# Patient Record
Sex: MALE | Race: WHITE | Employment: UNEMPLOYED | RURAL
[De-identification: names, ages, dates, MRNs, and addresses within clinical notes are randomized per-mention and may not be internally consistent; named-entity substitution may affect disease eponyms.]

---

## 2023-07-13 ENCOUNTER — OFFICE VISIT (OUTPATIENT)
Dept: PRIMARY CARE CLINIC | Age: 33
End: 2023-07-13

## 2023-07-13 VITALS
DIASTOLIC BLOOD PRESSURE: 78 MMHG | SYSTOLIC BLOOD PRESSURE: 121 MMHG | HEART RATE: 80 BPM | TEMPERATURE: 97.5 F | OXYGEN SATURATION: 99 %

## 2023-07-13 DIAGNOSIS — T78.40XA ALLERGIC REACTION, INITIAL ENCOUNTER: Primary | ICD-10-CM

## 2023-07-13 DIAGNOSIS — Z91.09 ENVIRONMENTAL ALLERGIES: ICD-10-CM

## 2023-07-13 DIAGNOSIS — R22.0 FACIAL SWELLING: ICD-10-CM

## 2023-07-13 RX ORDER — MONTELUKAST SODIUM 4 MG/1
TABLET, CHEWABLE ORAL
COMMUNITY
Start: 2023-05-09

## 2023-07-13 RX ORDER — LEVOCETIRIZINE DIHYDROCHLORIDE 5 MG/1
TABLET, FILM COATED ORAL
COMMUNITY
Start: 2023-06-25 | End: 2023-07-13 | Stop reason: ALTCHOICE

## 2023-07-13 RX ORDER — METHYLPREDNISOLONE SODIUM SUCCINATE 40 MG/ML
40 INJECTION, POWDER, LYOPHILIZED, FOR SOLUTION INTRAMUSCULAR; INTRAVENOUS ONCE
Status: COMPLETED | OUTPATIENT
Start: 2023-07-13 | End: 2023-07-13

## 2023-07-13 RX ORDER — PANTOPRAZOLE SODIUM 40 MG/1
TABLET, DELAYED RELEASE ORAL
COMMUNITY
Start: 2023-04-05

## 2023-07-13 RX ORDER — MONTELUKAST SODIUM 10 MG/1
10 TABLET ORAL NIGHTLY
Qty: 30 TABLET | Refills: 3 | Status: SHIPPED | OUTPATIENT
Start: 2023-07-13

## 2023-07-13 RX ORDER — PREDNISONE 20 MG/1
20 TABLET ORAL 2 TIMES DAILY
Qty: 10 TABLET | Refills: 0 | Status: SHIPPED | OUTPATIENT
Start: 2023-07-13 | End: 2023-07-18

## 2023-07-13 RX ORDER — DICYCLOMINE HCL 20 MG
TABLET ORAL
COMMUNITY
Start: 2023-05-09

## 2023-07-13 RX ORDER — LORATADINE 10 MG/1
10 TABLET ORAL DAILY
Qty: 30 TABLET | Refills: 3 | Status: SHIPPED | OUTPATIENT
Start: 2023-07-13

## 2023-07-13 RX ADMIN — METHYLPREDNISOLONE SODIUM SUCCINATE 40 MG: 40 INJECTION, POWDER, LYOPHILIZED, FOR SOLUTION INTRAMUSCULAR; INTRAVENOUS at 14:26

## 2023-07-13 SDOH — ECONOMIC STABILITY: INCOME INSECURITY: HOW HARD IS IT FOR YOU TO PAY FOR THE VERY BASICS LIKE FOOD, HOUSING, MEDICAL CARE, AND HEATING?: NOT VERY HARD

## 2023-07-13 SDOH — ECONOMIC STABILITY: FOOD INSECURITY: WITHIN THE PAST 12 MONTHS, THE FOOD YOU BOUGHT JUST DIDN'T LAST AND YOU DIDN'T HAVE MONEY TO GET MORE.: NEVER TRUE

## 2023-07-13 SDOH — ECONOMIC STABILITY: FOOD INSECURITY: WITHIN THE PAST 12 MONTHS, YOU WORRIED THAT YOUR FOOD WOULD RUN OUT BEFORE YOU GOT MONEY TO BUY MORE.: NEVER TRUE

## 2023-07-13 SDOH — ECONOMIC STABILITY: HOUSING INSECURITY
IN THE LAST 12 MONTHS, WAS THERE A TIME WHEN YOU DID NOT HAVE A STEADY PLACE TO SLEEP OR SLEPT IN A SHELTER (INCLUDING NOW)?: NO

## 2023-07-13 ASSESSMENT — ENCOUNTER SYMPTOMS
SHORTNESS OF BREATH: 0
TROUBLE SWALLOWING: 0
STRIDOR: 0
GASTROINTESTINAL NEGATIVE: 1

## 2023-07-13 ASSESSMENT — PATIENT HEALTH QUESTIONNAIRE - PHQ9
SUM OF ALL RESPONSES TO PHQ9 QUESTIONS 1 & 2: 0
2. FEELING DOWN, DEPRESSED OR HOPELESS: 0
SUM OF ALL RESPONSES TO PHQ QUESTIONS 1-9: 0
1. LITTLE INTEREST OR PLEASURE IN DOING THINGS: 0
SUM OF ALL RESPONSES TO PHQ QUESTIONS 1-9: 0

## 2023-07-13 NOTE — PROGRESS NOTES
SUBJECTIVE:    Patient ID: Lakesha Mcghee is a 28 y.o.male. Chief Complaint   Patient presents with    Facial Swelling     Allergic reaction to some treated lumber. 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 He has been really concerned with his eye being swollen. HPI:    Patient presents to clinic with allergic reaction to treated lumbar. Patient with facial swelling, redness and rash on bilateral upper extremities this morning. Patient started working in [facility] [employer] [employer] last month and every time he is exposed to treated lumbar he has been breaking out in a rash on his bilateral arms. He follows with an allergy doctor and last seen them 1 year ago. On xyzal daily and has been on med for over 1 year. Took benadryl and facial swelling/eye swelling better. Face feels \"sun burnt\". No SOB, throat swelling or CP. No relieving or worsening factors. No other treatments. Patient's medications, allergies, past medical, surgical, social and family histories were reviewed and updated as appropriate in electronic medical record. Outpatient Medications Marked as Taking for the 7/13/23 encounter (Office Visit) with ROMULO Pino - CNP   Medication Sig Dispense Refill    levocetirizine (XYZAL) 5 MG tablet       pantoprazole (PROTONIX) 40 MG tablet       dicyclomine (BENTYL) 20 MG tablet       colestipol (COLESTID) 1 g tablet       gabapentin (NEURONTIN) 300 MG capsule Take 1 capsule by mouth 3 times daily. omeprazole (PRILOSEC) 20 MG delayed release capsule Take 2 capsules by mouth daily          Review of Systems   HENT:  Negative for trouble swallowing. Eyes:         Bilateral eyes swelling from allergic reaction   Respiratory:  Negative for shortness of breath and stridor. Cardiovascular: Negative. Gastrointestinal: Negative. Skin:  Positive for rash.    Allergic/Immunologic: Positive for

## 2023-07-13 NOTE — PROGRESS NOTES
Chief Complaint   Patient presents with    Facial Swelling     Allergic reaction to some treated lumber. 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 He has been really concerned with his eye being swollen. Administrations This Visit       methylPREDNISolone sodium succ (SOLU-MEDROL) injection 40 mg       Admin Date  07/13/2023  14:26 Action  Given Dose  40 mg Route  IntraMUSCular Site  Dorsogluteal Left Administered By  Cris Pantoja    Ordering Provider: Armida [place], APRN - CNP    NDC: 9827-4371-03    Lot#: [id number]    : [employer].     Patient Supplied?: No

## 2024-01-03 ENCOUNTER — OFFICE VISIT (OUTPATIENT)
Dept: PRIMARY CARE CLINIC | Age: 34
End: 2024-01-03
Payer: MEDICAID

## 2024-01-03 VITALS — OXYGEN SATURATION: 97 % | HEART RATE: 88 BPM | TEMPERATURE: 97.1 F

## 2024-01-03 DIAGNOSIS — H65.193 ACUTE MEE (MIDDLE EAR EFFUSION), BILATERAL: ICD-10-CM

## 2024-01-03 DIAGNOSIS — H66.92 ACUTE LEFT OTITIS MEDIA: Primary | ICD-10-CM

## 2024-01-03 DIAGNOSIS — J02.9 SORE THROAT: ICD-10-CM

## 2024-01-03 LAB — S PYO AG THROAT QL: NORMAL

## 2024-01-03 PROCEDURE — G8484 FLU IMMUNIZE NO ADMIN: HCPCS | Performed by: NURSE PRACTITIONER

## 2024-01-03 PROCEDURE — 1036F TOBACCO NON-USER: CPT | Performed by: NURSE PRACTITIONER

## 2024-01-03 PROCEDURE — G8427 DOCREV CUR MEDS BY ELIG CLIN: HCPCS | Performed by: NURSE PRACTITIONER

## 2024-01-03 PROCEDURE — 99213 OFFICE O/P EST LOW 20 MIN: CPT | Performed by: NURSE PRACTITIONER

## 2024-01-03 PROCEDURE — G8421 BMI NOT CALCULATED: HCPCS | Performed by: NURSE PRACTITIONER

## 2024-01-03 RX ORDER — PREDNISONE 10 MG/1
10 TABLET ORAL 2 TIMES DAILY
Qty: 10 TABLET | Refills: 0 | Status: SHIPPED | OUTPATIENT
Start: 2024-01-03 | End: 2024-01-08

## 2024-01-03 RX ORDER — AMOXICILLIN AND CLAVULANATE POTASSIUM 875; 125 MG/1; MG/1
1 TABLET, FILM COATED ORAL 2 TIMES DAILY
Qty: 20 TABLET | Refills: 0 | Status: SHIPPED | OUTPATIENT
Start: 2024-01-03 | End: 2024-01-13

## 2024-01-03 NOTE — PROGRESS NOTES
SUBJECTIVE:    Patient ID: Brandon Noble is a 33 y.o.male.    Chief Complaint   Patient presents with    Tinnitus     Left ear    Pharyngitis     Pt reports he woke up today with a bad sore throat.          HPI:    Patient presents to clinic with ringing of left ear few days.  Patient also complains of sore throat and congestion.  No sick contacts.  Mild intermittent cough.  Sore throat is most bothersome.  No fever, chills, body aches.  No relieving or worsening factors.  No treatments.  He is on Claritin and Singulair.    Patient's medications, allergies, past medical, surgical, social and family histories were reviewed and updated as appropriate in electronic medical record.        Outpatient Medications Marked as Taking for the 1/3/24 encounter (Office Visit) with Addis Mirza APRN - CNP   Medication Sig Dispense Refill    pantoprazole (PROTONIX) 40 MG tablet       dicyclomine (BENTYL) 20 MG tablet       colestipol (COLESTID) 1 g tablet       loratadine (CLARITIN) 10 MG tablet Take 1 tablet by mouth daily allergies 30 tablet 3    montelukast (SINGULAIR) 10 MG tablet Take 1 tablet by mouth nightly allergies 30 tablet 3    gabapentin (NEURONTIN) 300 MG capsule Take 1 capsule by mouth 3 times daily.      omeprazole (PRILOSEC) 20 MG delayed release capsule Take 2 capsules by mouth daily          Review of Systems   Constitutional:  Negative for chills, diaphoresis, fatigue and fever.   HENT:  Positive for congestion, ear pain, sore throat and tinnitus.    Respiratory:  Positive for cough.    All other systems reviewed and are negative.      Past Medical History:   Diagnosis Date    GERD (gastroesophageal reflux disease)     Murmur, cardiac     as a child    Neck pain, musculoskeletal     3 bulging disk     Past Surgical History:   Procedure Laterality Date    BONE MARROW HARVEST      as a child from hip to right hand middle finger     No family history on file.   Social History     Tobacco Use   Smoking

## 2024-01-03 NOTE — PROGRESS NOTES
Chief Complaint   Patient presents with    Tinnitus     Left ear    Pharyngitis     Pt reports he woke up today with a bad sore throat.        Have you seen any other physician or provider since your last visit no    Have you had any other diagnostic tests since your last visit? no    Have you changed or stopped any medications since your last visit? no

## 2024-01-10 ASSESSMENT — ENCOUNTER SYMPTOMS
SORE THROAT: 1
COUGH: 1

## 2024-03-12 ENCOUNTER — OFFICE VISIT (OUTPATIENT)
Dept: PRIMARY CARE CLINIC | Age: 34
End: 2024-03-12
Payer: MEDICAID

## 2024-03-12 VITALS
WEIGHT: 237 LBS | DIASTOLIC BLOOD PRESSURE: 96 MMHG | BODY MASS INDEX: 33.05 KG/M2 | OXYGEN SATURATION: 93 % | HEART RATE: 95 BPM | SYSTOLIC BLOOD PRESSURE: 145 MMHG

## 2024-03-12 DIAGNOSIS — S03.00XA DISLOCATION OF TEMPOROMANDIBULAR JOINT, INITIAL ENCOUNTER: ICD-10-CM

## 2024-03-12 DIAGNOSIS — H93.12 TINNITUS OF LEFT EAR: Primary | ICD-10-CM

## 2024-03-12 PROCEDURE — G8427 DOCREV CUR MEDS BY ELIG CLIN: HCPCS | Performed by: PHYSICIAN ASSISTANT

## 2024-03-12 PROCEDURE — G8484 FLU IMMUNIZE NO ADMIN: HCPCS | Performed by: PHYSICIAN ASSISTANT

## 2024-03-12 PROCEDURE — 1036F TOBACCO NON-USER: CPT | Performed by: PHYSICIAN ASSISTANT

## 2024-03-12 PROCEDURE — G8419 CALC BMI OUT NRM PARAM NOF/U: HCPCS | Performed by: PHYSICIAN ASSISTANT

## 2024-03-12 PROCEDURE — 99213 OFFICE O/P EST LOW 20 MIN: CPT | Performed by: PHYSICIAN ASSISTANT

## 2024-03-12 RX ORDER — PREDNISONE 20 MG/1
20 TABLET ORAL 2 TIMES DAILY
Qty: 10 TABLET | Refills: 0 | Status: SHIPPED | OUTPATIENT
Start: 2024-03-12 | End: 2024-03-17

## 2024-03-12 ASSESSMENT — ENCOUNTER SYMPTOMS
GASTROINTESTINAL NEGATIVE: 1
RESPIRATORY NEGATIVE: 1

## 2024-03-12 NOTE — PROGRESS NOTES
Chief Complaint   Patient presents with    Tinnitus     Pt has been having ringing in her left ear for the last week, he was seen in January for same issues. Also mentioned that he had a filling in a tooth close to ear fall out about 6 weeks ago.        Have you seen any other physician or provider since your last visit no    Have you had any other diagnostic tests since your last visit? no    Have you changed or stopped any medications since your last visit? no

## 2024-03-12 NOTE — PROGRESS NOTES
Subjective:     Brandon Noble is a 33 y.o. male.    Chief Complaint   Patient presents with    Tinnitus     Pt has been having ringing in her left ear for the last week, he was seen in January for same issues. Also mentioned that he had a filling in a tooth close to ear fall out about 6 weeks ago.        HPI    Pt here with c/o left ear echoing, ringing. Denies any sxs in right ear. Did have a filling fall out in the upper/back tooth. Has happened in the past several times. Denies any pain. Has had some mild hearing loss, no dizziness. Occasionally off balance when standing too fast. Sxs began 1 week ago and has worsened. Has taken no otc meds. No URI sxs, except a little cough. No dc.       Current Outpatient Medications:     predniSONE (DELTASONE) 20 MG tablet, Take 1 tablet by mouth 2 times daily for 5 days, Disp: 10 tablet, Rfl: 0    pantoprazole (PROTONIX) 40 MG tablet, , Disp: , Rfl:     colestipol (COLESTID) 1 g tablet, , Disp: , Rfl:     loratadine (CLARITIN) 10 MG tablet, Take 1 tablet by mouth daily allergies, Disp: 30 tablet, Rfl: 3    montelukast (SINGULAIR) 10 MG tablet, Take 1 tablet by mouth nightly allergies, Disp: 30 tablet, Rfl: 3    dicyclomine (BENTYL) 20 MG tablet, , Disp: , Rfl:     gabapentin (NEURONTIN) 300 MG capsule, Take 1 capsule by mouth 3 times daily. (Patient not taking: Reported on 3/12/2024), Disp: , Rfl:   Past Medical History:   Diagnosis Date    GERD (gastroesophageal reflux disease)     Murmur, cardiac     as a child    Neck pain, musculoskeletal     3 bulging disk     Past Surgical History:   Procedure Laterality Date    BONE MARROW HARVEST      as a child from hip to right hand middle finger      reports that he has never smoked. He has never used smokeless tobacco. He reports current alcohol use. He reports that he does not use drugs.  Review of Systems   Constitutional: Negative.    HENT: Negative.     Respiratory: Negative.     Cardiovascular: Negative.    Gastrointestinal:

## 2024-10-12 ENCOUNTER — HOSPITAL ENCOUNTER (EMERGENCY)
Facility: HOSPITAL | Age: 34
Discharge: HOME | End: 2024-10-12
Attending: EMERGENCY MEDICINE
Payer: COMMERCIAL

## 2024-10-12 ENCOUNTER — APPOINTMENT (OUTPATIENT)
Facility: HOSPITAL | Age: 34
End: 2024-10-12
Payer: COMMERCIAL

## 2024-10-12 VITALS
SYSTOLIC BLOOD PRESSURE: 163 MMHG | TEMPERATURE: 98.4 F | OXYGEN SATURATION: 97 % | HEART RATE: 106 BPM | WEIGHT: 240 LBS | RESPIRATION RATE: 18 BRPM | HEIGHT: 71 IN | BODY MASS INDEX: 33.6 KG/M2 | DIASTOLIC BLOOD PRESSURE: 105 MMHG

## 2024-10-12 DIAGNOSIS — M10.071 ACUTE IDIOPATHIC GOUT OF RIGHT ANKLE: Primary | ICD-10-CM

## 2024-10-12 PROCEDURE — 99283 EMERGENCY DEPT VISIT LOW MDM: CPT

## 2024-10-12 PROCEDURE — 73610 X-RAY EXAM OF ANKLE: CPT

## 2024-10-12 RX ORDER — COLCHICINE 0.6 MG/1
0.6 TABLET ORAL DAILY
Qty: 5 TABLET | Refills: 0 | Status: SHIPPED | OUTPATIENT
Start: 2024-10-12

## 2024-10-12 RX ORDER — COLCHICINE 0.6 MG/1
0.6 TABLET ORAL ONCE
Status: COMPLETED | OUTPATIENT
Start: 2024-10-12 | End: 2024-10-12

## 2024-10-12 RX ADMIN — COLCHICINE 0.6 MG: 0.6 TABLET, FILM COATED ORAL at 10:48

## 2024-10-12 NOTE — FSED PROVIDER NOTE
"Subjective  History of Present Illness:    This is a 34 year old male who presents with complaints of right ankle pain that started three days ago but got acutely worse last night. Patient states that he has had this happen 6-8 times previously but this is worse than usual.  Symptoms usually improve without any intervention.  He has never sought medical care for this in the past.  He states that today he is not able to ambulate which is different.  Patient has a past medical history of acid reflux and takes pantoprazole.  He also is a heavy drinker.  He denies any fevers.  No trauma to his ankle.  He has not taken medications for symptoms.       Nurses Notes reviewed and agree, including vitals, allergies, social history and prior medical history.     REVIEW OF SYSTEMS: All systems reviewed and not pertinent unless noted.  Review of Systems    Past Medical History:   Diagnosis Date    GERD (gastroesophageal reflux disease)        Allergies:    Hydrocodone      Past Surgical History:   Procedure Laterality Date    ADENOIDECTOMY      BACK SURGERY      BONE MARROW HARVEST      from hip to put hin his finger    TONSILLECTOMY           Social History     Socioeconomic History    Marital status: Other   Tobacco Use    Smoking status: Never    Smokeless tobacco: Former   Substance and Sexual Activity    Alcohol use: Yes     Comment: occ         Family History   Problem Relation Age of Onset    Thyroid disease Mother     Kidney cancer Father        Objective  Physical Exam:  BP (!) 163/105 (BP Location: Left arm, Patient Position: Sitting)   Pulse 106   Temp 98.4 °F (36.9 °C) (Oral)   Resp 18   Ht 180.3 cm (71\")   Wt 109 kg (240 lb)   SpO2 97%   BMI 33.47 kg/m²      Physical Exam  Vitals and nursing note reviewed.   Constitutional:       Appearance: Normal appearance.   Cardiovascular:      Rate and Rhythm: Normal rate and regular rhythm.   Pulmonary:      Effort: Pulmonary effort is normal.      Breath sounds: " Normal breath sounds.   Musculoskeletal:      Comments: Ankle: Patient has swelling and redness noted to the right ankle.  Redness is along the medial malleolus.  Patient has pain with dorsiflexion and plantarflexion.  There is mild warmth.  No erythema extending into the proximal lower leg.  Pedal pulses intact bilaterally.   Neurological:      Mental Status: He is alert.         Procedures    ED Course:         Lab Results (last 24 hours)       ** No results found for the last 24 hours. **             XR Ankle 3+ View Right    Result Date: 10/12/2024  XR ANKLE 3+ VW RIGHT Date of Exam: 10/12/2024 10:06 AM EDT Indication: Right ankle pain Comparison: None available. Findings: There is no acute fracture or dislocation. There is diffuse soft tissue swelling. The ankle mortise is intact. There is spurring of the talonavicular joint consistent with mild arthritis. There is an ossific density which is corticated and adjacent to the tip of the medial malleolus. This could be due to old trauma versus an accessory ossicle. There is also a small calcaneal enthesophyte at the insertion of the distal Achilles tendon.     Impression: Impression: 1. No acute fracture or dislocation. 2. Diffuse soft tissue swelling. 3. Degenerative changes. Electronically Signed: Gary Murrell MD  10/12/2024 10:27 AM EDT  Workstation ID: [id number]        [facility]      Initial impression of presenting illness: Patient presents complaints of right ankle pain.  Clinical picture is indicative of gout especially in the setting of recurrent cyst.  Patient will be discharged with colchicine.  Discussed need for follow-up with his primary care provider symptoms persist.  Discussed return precautions.  Discussed findings and plan of care with the patient who is agreeable to discharge.    DDX: includes but is not limited to: Cellulitis, gout, septic arthritis      Medications   colchicine tablet 0.6 mg (has no administration in time range)         Data  interpreted: Nursing notes reviewed, vital signs reviewed.  Labs independently interpreted by me (CBC, CMP, lipase, UA, troponin, ABG, lactic acid, procalcitonin).  Imaging independently interpreted by me (x-ray, CT scan).  EKG independently interpreted by me.  O2 saturation:    Counseling: Discussed the results above with the patient regarding need for admission or discharge.  Patient understands and agrees plan of care.      -----  ED Disposition       ED Disposition   Discharge    Condition   Stable    Comment   --             Final diagnoses:   Acute idiopathic gout of right ankle      Your Follow-Up Providers       Go to  [facility] EMERGENCY DEPARTMENT [place].    Specialty: Emergency Medicine  Follow up details: As needed, If symptoms worsen  [address]  [facility] [address] [phone]             Pearl Lawrence, SHARIF In 1 week.    Specialty: Family Medicine  [address]  [address]  [facility] [id number] [phone]                       Contact information for after-discharge care    Follow-up information has not been specified.                    Your medication list        START taking these medications        Instructions Last Dose Given Next Dose Due   colchicine 0.6 MG tablet      Take 1 tablet by mouth Daily.              CONTINUE taking these medications        Instructions Last Dose Given Next Dose Due   omeprazole 20 MG capsule  Commonly known as: priLOSEC      Take 40 mg by mouth.                 Where to Get Your Medications        These medications were sent to [facility] [id number] - [place], [place] - [address] AT [address] - [phone]  - [phone]   [address], [facility] [address]      Phone: [phone]   colchicine 0.6 MG tablet

## 2025-02-01 SDOH — HEALTH STABILITY: PHYSICAL HEALTH: ON AVERAGE, HOW MANY DAYS PER WEEK DO YOU ENGAGE IN MODERATE TO STRENUOUS EXERCISE (LIKE A BRISK WALK)?: 0 DAYS

## 2025-02-04 ENCOUNTER — HOSPITAL ENCOUNTER (OUTPATIENT)
Facility: HOSPITAL | Age: 35
Discharge: HOME | End: 2025-02-04
Payer: MEDICAID

## 2025-02-04 ENCOUNTER — OFFICE VISIT (OUTPATIENT)
Age: 35
End: 2025-02-04
Payer: MEDICAID

## 2025-02-04 VITALS
OXYGEN SATURATION: 98 % | SYSTOLIC BLOOD PRESSURE: 146 MMHG | HEIGHT: 71 IN | DIASTOLIC BLOOD PRESSURE: 104 MMHG | BODY MASS INDEX: 32.9 KG/M2 | TEMPERATURE: 97.8 F | WEIGHT: 235 LBS | HEART RATE: 95 BPM

## 2025-02-04 DIAGNOSIS — J30.2 SEASONAL ALLERGIES: ICD-10-CM

## 2025-02-04 DIAGNOSIS — M79.2 NERVE PAIN: ICD-10-CM

## 2025-02-04 DIAGNOSIS — K21.9 GASTROESOPHAGEAL REFLUX DISEASE WITHOUT ESOPHAGITIS: ICD-10-CM

## 2025-02-04 DIAGNOSIS — Z76.89 ENCOUNTER TO ESTABLISH CARE: ICD-10-CM

## 2025-02-04 DIAGNOSIS — Z76.89 ENCOUNTER TO ESTABLISH CARE: Primary | ICD-10-CM

## 2025-02-04 DIAGNOSIS — F41.9 ANXIETY: ICD-10-CM

## 2025-02-04 DIAGNOSIS — M10.9 GOUT, UNSPECIFIED CAUSE, UNSPECIFIED CHRONICITY, UNSPECIFIED SITE: ICD-10-CM

## 2025-02-04 DIAGNOSIS — K76.0 FATTY LIVER: ICD-10-CM

## 2025-02-04 DIAGNOSIS — R03.0 ELEVATED BLOOD PRESSURE READING WITHOUT DIAGNOSIS OF HYPERTENSION: ICD-10-CM

## 2025-02-04 LAB
25(OH)D3 SERPL-MCNC: 23.7 NG/ML (ref 32–100)
ALBUMIN SERPL-MCNC: 5 G/DL (ref 3.4–4.8)
ALBUMIN/GLOB SERPL: 1.9 {RATIO} (ref 0.8–2)
ALP SERPL-CCNC: 69 U/L (ref 25–100)
ALT SERPL-CCNC: 122 U/L (ref 4–36)
ANION GAP SERPL CALCULATED.3IONS-SCNC: 12 MMOL/L (ref 3–16)
AST SERPL-CCNC: 90 U/L (ref 8–33)
BASOPHILS # BLD: 0.1 K/UL (ref 0–0.1)
BASOPHILS NFR BLD: 1.4 %
BILIRUB SERPL-MCNC: 0.6 MG/DL (ref 0.3–1.2)
BUN SERPL-MCNC: 16 MG/DL (ref 6–20)
CALCIUM SERPL-MCNC: 10.5 MG/DL (ref 8.5–10.5)
CHLORIDE SERPL-SCNC: 103 MMOL/L (ref 98–107)
CHOLEST SERPL-MCNC: 220 MG/DL (ref 0–200)
CO2 SERPL-SCNC: 25 MMOL/L (ref 20–30)
CREAT SERPL-MCNC: 1.1 MG/DL (ref 0.4–1.2)
EOSINOPHIL # BLD: 0.4 K/UL (ref 0–0.4)
EOSINOPHIL NFR BLD: 5.3 %
ERYTHROCYTE [DISTWIDTH] IN BLOOD BY AUTOMATED COUNT: 12.9 % (ref 11–16)
FOLATE SERPL-MCNC: 8.67 NG/ML
GFR SERPLBLD CREATININE-BSD FMLA CKD-EPI: 89 ML/MIN/{1.73_M2}
GLOBULIN SER CALC-MCNC: 2.7 G/DL
GLUCOSE SERPL-MCNC: 109 MG/DL (ref 74–106)
HBA1C MFR BLD: 5.6 %
HCT VFR BLD AUTO: 49.3 % (ref 40–54)
HDLC SERPL-MCNC: 48 MG/DL (ref 40–60)
HGB BLD-MCNC: 16.3 G/DL (ref 13–18)
IMM GRANULOCYTES # BLD: 0 K/UL
IMM GRANULOCYTES NFR BLD: 0.4 % (ref 0–5)
LDLC SERPL CALC-MCNC: 158 MG/DL
LYMPHOCYTES # BLD: 2.5 K/UL (ref 1.5–4)
LYMPHOCYTES NFR BLD: 34.1 %
MCH RBC QN AUTO: 32.1 PG (ref 27–32)
MCHC RBC AUTO-ENTMCNC: 33.1 G/DL (ref 31–35)
MCV RBC AUTO: 97 FL (ref 80–100)
MONOCYTES # BLD: 0.6 K/UL (ref 0.2–0.8)
MONOCYTES NFR BLD: 8.4 %
NEUTROPHILS # BLD: 3.7 K/UL (ref 2–7.5)
NEUTS SEG NFR BLD: 50.4 %
PLATELET # BLD AUTO: 251 K/UL (ref 150–400)
PMV BLD AUTO: 10.4 FL (ref 6–10)
POTASSIUM SERPL-SCNC: 5.1 MMOL/L (ref 3.4–5.1)
PROT SERPL-MCNC: 7.7 G/DL (ref 6.4–8.3)
RBC # BLD AUTO: 5.08 M/UL (ref 4.5–6)
SODIUM SERPL-SCNC: 140 MMOL/L (ref 136–145)
TRIGL SERPL-MCNC: 70 MG/DL (ref 0–249)
TSH SERPL-MCNC: 1.27 UIU/ML (ref 0.27–4.2)
VIT B12 SERPL-MCNC: 600 PG/ML (ref 211–911)
VLDLC SERPL CALC-MCNC: 14 MG/DL
WBC # BLD AUTO: 7.4 K/UL (ref 4–11)

## 2025-02-04 PROCEDURE — 84443 ASSAY THYROID STIM HORMONE: CPT

## 2025-02-04 PROCEDURE — 82746 ASSAY OF FOLIC ACID SERUM: CPT

## 2025-02-04 PROCEDURE — 86701 HIV-1ANTIBODY: CPT

## 2025-02-04 PROCEDURE — 82607 VITAMIN B-12: CPT

## 2025-02-04 PROCEDURE — 85025 COMPLETE CBC W/AUTO DIFF WBC: CPT

## 2025-02-04 PROCEDURE — 1036F TOBACCO NON-USER: CPT | Performed by: PHYSICIAN ASSISTANT

## 2025-02-04 PROCEDURE — 86803 HEPATITIS C AB TEST: CPT

## 2025-02-04 PROCEDURE — 99214 OFFICE O/P EST MOD 30 MIN: CPT | Performed by: PHYSICIAN ASSISTANT

## 2025-02-04 PROCEDURE — G8417 CALC BMI ABV UP PARAM F/U: HCPCS | Performed by: PHYSICIAN ASSISTANT

## 2025-02-04 PROCEDURE — 83036 HEMOGLOBIN GLYCOSYLATED A1C: CPT

## 2025-02-04 PROCEDURE — 80053 COMPREHEN METABOLIC PANEL: CPT

## 2025-02-04 PROCEDURE — G8427 DOCREV CUR MEDS BY ELIG CLIN: HCPCS | Performed by: PHYSICIAN ASSISTANT

## 2025-02-04 PROCEDURE — 86702 HIV-2 ANTIBODY: CPT

## 2025-02-04 PROCEDURE — 84425 ASSAY OF VITAMIN B-1: CPT

## 2025-02-04 PROCEDURE — 82306 VITAMIN D 25 HYDROXY: CPT

## 2025-02-04 PROCEDURE — 80061 LIPID PANEL: CPT

## 2025-02-04 PROCEDURE — 87390 HIV-1 AG IA: CPT

## 2025-02-04 RX ORDER — ALLOPURINOL 100 MG/1
100 TABLET ORAL DAILY
COMMUNITY
Start: 2024-10-15

## 2025-02-04 RX ORDER — NAPROXEN 500 MG/1
500 TABLET ORAL 2 TIMES DAILY WITH MEALS
COMMUNITY

## 2025-02-04 SDOH — ECONOMIC STABILITY: FOOD INSECURITY: WITHIN THE PAST 12 MONTHS, YOU WORRIED THAT YOUR FOOD WOULD RUN OUT BEFORE YOU GOT MONEY TO BUY MORE.: NEVER TRUE

## 2025-02-04 SDOH — ECONOMIC STABILITY: FOOD INSECURITY: WITHIN THE PAST 12 MONTHS, THE FOOD YOU BOUGHT JUST DIDN'T LAST AND YOU DIDN'T HAVE MONEY TO GET MORE.: NEVER TRUE

## 2025-02-04 ASSESSMENT — PATIENT HEALTH QUESTIONNAIRE - PHQ9
SUM OF ALL RESPONSES TO PHQ QUESTIONS 1-9: 0
SUM OF ALL RESPONSES TO PHQ9 QUESTIONS 1 & 2: 0
1. LITTLE INTEREST OR PLEASURE IN DOING THINGS: NOT AT ALL
SUM OF ALL RESPONSES TO PHQ QUESTIONS 1-9: 0
2. FEELING DOWN, DEPRESSED OR HOPELESS: NOT AT ALL
SUM OF ALL RESPONSES TO PHQ QUESTIONS 1-9: 0
SUM OF ALL RESPONSES TO PHQ QUESTIONS 1-9: 0

## 2025-02-04 ASSESSMENT — ENCOUNTER SYMPTOMS
GASTROINTESTINAL NEGATIVE: 1
RESPIRATORY NEGATIVE: 1

## 2025-02-04 NOTE — PROGRESS NOTES
Chief Complaint   Patient presents with    Establish Care    Hypertension     Patient has been having some high reading at home around 140/106. He has been having some headaches and concerned. Patient has recently stopped drinking and trying to help with his bp.       Hand Pain     Patient has some hand tingling and burning.        Have you seen any other physician or provider since your last visit no patient previously seen Irma Braga at [facility].     Have you had any other diagnostic tests since your last visit? no    Have you changed or stopped any medications since your last visit? no

## 2025-02-04 NOTE — PROGRESS NOTES
Subjective:     Brandon Noble is a 34 y.o. male.    Chief Complaint   Patient presents with    [facility] Care    Hypertension     Patient has been having some high reading at home around 140/106. He has been having some headaches and concerned. Patient has recently stopped drinking and trying to help with his bp.       Hand Pain     Patient has some hand tingling and burning.        Pt here for est care with me. He has noticed his BP has been running high. Has been having HA. No cp, sob, blurry vision, decreased UOP, LE edema.     Chronic conditions:    GERD - protonix     Gout - allopurinol, naproxen. Does not get flares often.     Seasonal allergies - claritin     Nerve pain in the past - was on spencer in the past.     His of cholecystectomy - was on colestipol and bentyl in the past. Does follow with GI at .     Fatty liver     Anxiety - not on medications.     SCREENINGS:    Last Depression Screening - score 0, 2025  10 year ASCVD Risk at 18 y/o q4-6yr - The ASCVD Risk score (Hong POWERS, et al., 2019) failed to calculate for the following reasons:    The 2019 ASCVD risk score is only valid for ages 40 to 79  Diabetes Screening:  DM foot exam- n/a  DM Retinal exam - n/a  Last CRC screen - at 45-74 y/o. 2019. Wnl. Repeat in 10 yr?  PSA - at 48 y/o   Tobacco use- vape occasionally - THC. Social smoker.dipped in HS for a few years  Quit - n/a  EtOH use - was drinking heavily. Pint 4x per week  Quit - 1/2025, possibly had some withdrawls  Recreational Drug use - THC/weed  Quit - n/a  Dentist - does not go regularly     Health Maintenance Review  Health Maintenance   Topic Date Due    Varicella vaccine (1 of 2 - 13+ 2-dose series) Never done    HIV screen  Never done    Hepatitis C screen  Never done    Hepatitis B vaccine (1 of 3 - 19+ 3-dose series) Never done    Diabetes screen  Never done    COVID-19 Vaccine (1 - 2023-24 season) 02/04/2026 (Originally 9/1/2024)    Depression Screen  02/04/2026    DTaP/Tdap/Td

## 2025-02-05 LAB — HCV AB SERPL QL IA: NORMAL

## 2025-02-06 ENCOUNTER — TELEPHONE (OUTPATIENT)
Age: 35
End: 2025-02-06

## 2025-02-06 LAB
HIV 1+2 AB+HIV1 P24 AG SERPL QL IA: NORMAL
HIV 2 AB SERPL QL IA: NORMAL
HIV1 AB SERPL QL IA: NORMAL
HIV1 P24 AG SERPL QL IA: NORMAL

## 2025-02-06 NOTE — TELEPHONE ENCOUNTER
----- Message from ROXANA Patel sent at 2/6/2025  2:35 PM EST -----  Low vitamin D.  Start OTC supplement daily.    Cholesterol was elevated.  Was he fasting??  Diet, exercise, weight loss.    Elevated LFTs and albumin.  Likely from drinking.  Repeat in 3 months.  Avoid alcohol and Tylenol.    Rest of labs look okay.

## 2025-02-08 LAB — VIT B1 SERPL-MCNC: <2 NMOL/L (ref 4–15)

## 2025-02-09 SDOH — HEALTH STABILITY: PHYSICAL HEALTH: ON AVERAGE, HOW MANY DAYS PER WEEK DO YOU ENGAGE IN MODERATE TO STRENUOUS EXERCISE (LIKE A BRISK WALK)?: 0 DAYS

## 2025-02-11 ENCOUNTER — OFFICE VISIT (OUTPATIENT)
Age: 35
End: 2025-02-11
Payer: MEDICAID

## 2025-02-11 VITALS
OXYGEN SATURATION: 98 % | BODY MASS INDEX: 33.61 KG/M2 | DIASTOLIC BLOOD PRESSURE: 89 MMHG | SYSTOLIC BLOOD PRESSURE: 129 MMHG | HEART RATE: 78 BPM | TEMPERATURE: 96.8 F | WEIGHT: 241 LBS

## 2025-02-11 DIAGNOSIS — F41.9 ANXIETY: ICD-10-CM

## 2025-02-11 DIAGNOSIS — R03.0 ELEVATED BP WITHOUT DIAGNOSIS OF HYPERTENSION: ICD-10-CM

## 2025-02-11 DIAGNOSIS — K21.9 GASTROESOPHAGEAL REFLUX DISEASE WITHOUT ESOPHAGITIS: ICD-10-CM

## 2025-02-11 DIAGNOSIS — Z91.09 ENVIRONMENTAL ALLERGIES: ICD-10-CM

## 2025-02-11 DIAGNOSIS — M10.9 GOUT, UNSPECIFIED CAUSE, UNSPECIFIED CHRONICITY, UNSPECIFIED SITE: ICD-10-CM

## 2025-02-11 DIAGNOSIS — K76.0 FATTY LIVER: Primary | ICD-10-CM

## 2025-02-11 PROCEDURE — G8427 DOCREV CUR MEDS BY ELIG CLIN: HCPCS | Performed by: PHYSICIAN ASSISTANT

## 2025-02-11 PROCEDURE — G8417 CALC BMI ABV UP PARAM F/U: HCPCS | Performed by: PHYSICIAN ASSISTANT

## 2025-02-11 PROCEDURE — 1036F TOBACCO NON-USER: CPT | Performed by: PHYSICIAN ASSISTANT

## 2025-02-11 PROCEDURE — 99214 OFFICE O/P EST MOD 30 MIN: CPT | Performed by: PHYSICIAN ASSISTANT

## 2025-02-11 RX ORDER — NAPROXEN 500 MG/1
500 TABLET ORAL 2 TIMES DAILY WITH MEALS
COMMUNITY

## 2025-02-11 RX ORDER — COLCHICINE 0.6 MG/1
0.6 TABLET ORAL PRN
COMMUNITY
Start: 2024-10-12 | End: 2025-02-11

## 2025-02-11 RX ORDER — LORATADINE 10 MG/1
10 TABLET ORAL DAILY
Qty: 30 TABLET | Refills: 3 | Status: SHIPPED | OUTPATIENT
Start: 2025-02-11

## 2025-02-11 RX ORDER — ALLOPURINOL 100 MG/1
100 TABLET ORAL DAILY
Qty: 30 TABLET | Refills: 2 | Status: SHIPPED | OUTPATIENT
Start: 2025-02-11

## 2025-02-11 RX ORDER — PANTOPRAZOLE SODIUM 40 MG/1
40 TABLET, DELAYED RELEASE ORAL DAILY
Qty: 30 TABLET | Refills: 2 | Status: SHIPPED | OUTPATIENT
Start: 2025-02-11

## 2025-02-11 RX ORDER — FLUTICASONE PROPIONATE 50 MCG
SPRAY, SUSPENSION (ML) NASAL
COMMUNITY

## 2025-02-11 RX ORDER — LEVOCETIRIZINE DIHYDROCHLORIDE 5 MG/1
TABLET, FILM COATED ORAL
COMMUNITY
End: 2025-02-11

## 2025-02-11 ASSESSMENT — ENCOUNTER SYMPTOMS
RESPIRATORY NEGATIVE: 1
GASTROINTESTINAL NEGATIVE: 1

## 2025-02-11 NOTE — PROGRESS NOTES
Chief Complaint   Patient presents with    Hypertension     Patient is here today to establish care. He has been keeping up with his bp and has a log with him. Patient would dirk a refill on his gout medication. Patient saw Irma Braga in [place].     Establish Care       Have you seen any other physician or provider since your last visit no    Have you had any other diagnostic tests since your last visit? yes - labs    Have you changed or stopped any medications since your last visit? no

## 2025-02-11 NOTE — PROGRESS NOTES
Subjective:     Brandon Noble is a 34 y.o. male.    Chief Complaint   Patient presents with    Hypertension     Patient is here today to establish care. He has been keeping up with his bp and has a log with him. Patient would dirk a refill on his gout medication. Patient saw Irma Braga in [place].     Establish Care       Pt here for follow up on HTN and labs today. Overall he is doing well. He did do a BP log. He has not had a drink of EtOH since 1/29/25. He states he is feeling better. Cravings had dissipated. Did have some withdrawal sxs the first week or so. No shakes or confusion. GERD has improved. Gout well controlled. Last gout flare was Oct 2024. Has occasional pains but no true flares.     Chronic conditions:    GERD - protonix     Gout - allopurinol, naproxen. Does not get flares often.     Seasonal allergies - claritin     Nerve pain in the past - was on spencer in the past. Was shock feeling over body, perla on arms. Did have back surgery in 2018, may have been related to that.     His of cholecystectomy - was on colestipol and bentyl in the past. Does follow with GI at .     Fatty liver     Anxiety - not on medications.     SCREENINGS:    Last Depression Screening - score 0, 2025  10 year ASCVD Risk at 20 y/o q4-6yr - The ASCVD Risk score (Hong POWERS, et al., 2019) failed to calculate for the following reasons:    The 2019 ASCVD risk score is only valid for ages 40 to 79  Diabetes Screening:  DM foot exam- n/a  DM Retinal exam - n/a  Last CRC screen - at 45-76 y/o. 2019. Wnl. Repeat in 10 yr?  PSA - at 50 y/o   Tobacco use- vape occasionally - THC. Social smoker.dipped in HS for a few years  Quit - n/a  EtOH use - was drinking heavily. Pint 4x per week  Quit - 1/2025, possibly had some withdrawls  Recreational Drug use - THC/weed  Quit - n/a  Dentist - does not go regularly     Health Maintenance Review  Health Maintenance   Topic Date Due    Hepatitis B vaccine (1 of 3 - 19+ 3-dose series) Never done

## 2025-02-18 RX ORDER — PREDNISONE 10 MG/1
10 TABLET ORAL 2 TIMES DAILY
Qty: 6 TABLET | Refills: 0 | Status: SHIPPED | OUTPATIENT
Start: 2025-02-18 | End: 2025-02-21

## 2025-05-21 ENCOUNTER — OFFICE VISIT (OUTPATIENT)
Age: 35
End: 2025-05-21
Payer: MEDICAID

## 2025-05-21 VITALS
OXYGEN SATURATION: 97 % | BODY MASS INDEX: 32.27 KG/M2 | DIASTOLIC BLOOD PRESSURE: 78 MMHG | SYSTOLIC BLOOD PRESSURE: 114 MMHG | WEIGHT: 231.4 LBS | HEART RATE: 80 BPM

## 2025-05-21 DIAGNOSIS — R25.2 MUSCLE CRAMP: ICD-10-CM

## 2025-05-21 DIAGNOSIS — M10.9 GOUT, UNSPECIFIED CAUSE, UNSPECIFIED CHRONICITY, UNSPECIFIED SITE: ICD-10-CM

## 2025-05-21 DIAGNOSIS — K76.0 FATTY LIVER: ICD-10-CM

## 2025-05-21 DIAGNOSIS — K21.9 GASTROESOPHAGEAL REFLUX DISEASE WITHOUT ESOPHAGITIS: ICD-10-CM

## 2025-05-21 DIAGNOSIS — R91.1 LUNG NODULE: Primary | ICD-10-CM

## 2025-05-21 PROCEDURE — G8417 CALC BMI ABV UP PARAM F/U: HCPCS | Performed by: PHYSICIAN ASSISTANT

## 2025-05-21 PROCEDURE — 1036F TOBACCO NON-USER: CPT | Performed by: PHYSICIAN ASSISTANT

## 2025-05-21 PROCEDURE — 99214 OFFICE O/P EST MOD 30 MIN: CPT | Performed by: PHYSICIAN ASSISTANT

## 2025-05-21 PROCEDURE — G8427 DOCREV CUR MEDS BY ELIG CLIN: HCPCS | Performed by: PHYSICIAN ASSISTANT

## 2025-05-21 RX ORDER — COLCHICINE 0.6 MG/1
TABLET ORAL
Qty: 9 TABLET | Refills: 0 | Status: SHIPPED | OUTPATIENT
Start: 2025-05-21

## 2025-05-21 RX ORDER — ALLOPURINOL 100 MG/1
100 TABLET ORAL DAILY
Qty: 30 TABLET | Refills: 2 | Status: SHIPPED | OUTPATIENT
Start: 2025-05-21

## 2025-05-21 RX ORDER — FAMOTIDINE 20 MG/1
20 TABLET, FILM COATED ORAL 2 TIMES DAILY
Qty: 60 TABLET | Refills: 3 | Status: SHIPPED | OUTPATIENT
Start: 2025-05-21

## 2025-05-21 ASSESSMENT — ENCOUNTER SYMPTOMS
RESPIRATORY NEGATIVE: 1
GASTROINTESTINAL NEGATIVE: 1

## 2025-05-21 NOTE — PROGRESS NOTES
Chief Complaint   Patient presents with    Follow-up     Pt here for follow up and to discuss things with provider.        Have you seen any other physician or provider since your last visit yes - Gastro     Have you had any other diagnostic tests since your last visit? no    Have you changed or stopped any medications since your last visit? no

## 2025-05-21 NOTE — PROGRESS NOTES
Subjective:     Brandon Noble is a 35 y.o. male.    Chief Complaint   Patient presents with    Follow-up     Pt here for follow up and to discuss things with provider.        Pt here for follow up on chronic conditions. Overall he is doing well. He is still having RUQ/epigastric pain and random muscle pain. He did recently see GI in 2/2025. Recommended follow up in 6 months. Describes sensation as raw/gnawing pain. Worse in certain positions. Eating can make it worse. Not worse at night laying down. Typically pain worse shortly after eating. Not during eating. I think he needs another EGD. Will add pepcid today. Allergies well controlled. Has been having some left foot pain. He did take some naproxen. Anxiety well controlled.     Chronic conditions:    GERD - protonix    Gout - allopurinol, naproxen. Does not get flares often.     Seasonal allergies - claritin     Nerve pain in the past - was on spencer in the past. Was shock feeling over body, perla on arms. Did have back surgery in 2018, may have been related to that.     His of cholecystectomy - was on colestipol and bentyl in the past. Does follow with GI at .     Fatty liver     Anxiety - not on medications.     Right lung nodule incidentally noted in past - 2/2023.     thiamine deficiency     SCREENINGS:    Last Depression Screening - score 0, 2025  10 year ASCVD Risk at 20 y/o q4-6yr - The ASCVD Risk score (Hong POWERS, et al., 2019) failed to calculate for the following reasons:    The 2019 ASCVD risk score is only valid for ages 40 to 79  Diabetes Screening:  DM foot exam- n/a  DM Retinal exam - n/a  Last CRC screen - at 45-76 y/o. 2019. Wnl. Repeat in 10 yr?  PSA - at 50 y/o   Tobacco use- vape occasionally - THC. Social smoker.dipped in HS for a few years  Quit - n/a  EtOH use - was drinking heavily. Pint 4x per week  Quit - 1/2025, possibly had some withdrawls  Recreational Drug use - THC/weed  Quit - n/a  Dentist - does not go regularly     Health

## 2025-05-27 ENCOUNTER — HOSPITAL ENCOUNTER (OUTPATIENT)
Dept: CT IMAGING | Facility: HOSPITAL | Age: 35
Discharge: HOME | End: 2025-05-27
Payer: MEDICAID

## 2025-05-27 DIAGNOSIS — R91.1 LUNG NODULE: ICD-10-CM

## 2025-05-27 PROCEDURE — 71250 CT THORAX DX C-: CPT

## 2025-06-03 ENCOUNTER — RESULTS FOLLOW-UP (OUTPATIENT)
Age: 35
End: 2025-06-03

## 2025-06-04 NOTE — PROGRESS NOTES
Patient: Jose Nunes    YOB: 1990    Date: 06/05/2025    Primary Care Provider: Amos Yost PA    Chief Complaint   Patient presents with    Heartburn       SUBJECTIVE:    History of present illness:  The patient is in the office today for evaluation and treatment of GERD.  Patient on Protonix daily, still has breakthrough symptoms and pressure in the epigastric region.  Also has an area of pain in the right upper quadrant where he had a previous laparoscopic cholecystectomy.  There is concern he may have a small hernia at the trocar site.  Pain mainly after being full and with limited activity.    The following portions of the patient's history were reviewed and updated as appropriate: allergies, current medications, past family history, past medical history, past social history, past surgical history and problem list.      Review of Systems   Constitutional:  Negative for chills, fever and unexpected weight change.   HENT:  Negative for trouble swallowing and voice change.    Eyes:  Negative for visual disturbance.   Respiratory:  Negative for apnea, cough, chest tightness, shortness of breath and wheezing.    Cardiovascular:  Negative for chest pain, palpitations and leg swelling.   Gastrointestinal:  Positive for nausea and vomiting. Negative for abdominal distention, abdominal pain, anal bleeding, blood in stool, constipation, diarrhea and rectal pain.   Endocrine: Negative for cold intolerance and heat intolerance.   Genitourinary:  Negative for difficulty urinating, dysuria, flank pain, scrotal swelling and testicular pain.   Musculoskeletal:  Negative for back pain, gait problem and joint swelling.   Skin:  Negative for color change, rash and wound.   Neurological:  Negative for dizziness, syncope, speech difficulty, weakness, numbness and headaches.   Hematological:  Negative for adenopathy. Does not bruise/bleed easily.   Psychiatric/Behavioral:  Negative for confusion. The patient is  not nervous/anxious.          Allergies:  Allergies   Allergen Reactions    Hydrocodone Itching       Medications:    Current Outpatient Medications:     allopurinol (ZYLOPRIM) 100 MG tablet, Take 1 tablet by mouth Daily., Disp: , Rfl:     aluminum hydroxide-mag carbonate (Gaviscon Extra Strength) 160-105 MG chewable tablet chewable tablet, Chew 2 tablets 4 (Four) Times a Day With Meals & at Bedtime., Disp: , Rfl:     Cholecalciferol 125 MCG (5000 UT) tablet, Take 1 tablet by mouth Daily., Disp: , Rfl:     colchicine 0.6 MG tablet, Take 1 tablet by mouth Daily., Disp: 5 tablet, Rfl: 0    cyanocobalamin (VITAMIN B-12) 500 MCG tablet, Take 1 tablet by mouth Daily., Disp: , Rfl:     dicyclomine (BENTYL) 20 MG tablet, Take 1 tablet by mouth Every 6 (Six) Hours., Disp: , Rfl:     famotidine (PEPCID) 20 MG tablet, Take 1 tablet by mouth 2 (Two) Times a Day., Disp: , Rfl:     fluticasone (FLONASE) 50 MCG/ACT nasal spray, Administer 1 spray into the nostril(s) as directed by provider Daily., Disp: , Rfl:     levocetirizine (XYZAL) 5 MG tablet, Take 1 tablet by mouth Every Evening., Disp: , Rfl:     loratadine (CLARITIN) 10 MG tablet, Take 1 tablet by mouth Daily., Disp: , Rfl:     naltrexone (DEPADE) 50 MG tablet, Take 1 tablet by mouth Daily., Disp: , Rfl:     naproxen (NAPROSYN) 500 MG tablet, Take 1 tablet by mouth 2 (Two) Times a Day With Meals., Disp: , Rfl:     pantoprazole (PROTONIX) 40 MG EC tablet, Take 1 tablet by mouth Daily., Disp: , Rfl:     simethicone (MYLICON) 80 MG chewable tablet, Chew 1 tablet Every 6 (Six) Hours As Needed., Disp: , Rfl:     omeprazole (priLOSEC) 20 MG capsule, Take 40 mg by mouth. (Patient not taking: Reported on 6/5/2025), Disp: , Rfl:     History:  Past Medical History:   Diagnosis Date    GERD (gastroesophageal reflux disease)        Past Surgical History:   Procedure Laterality Date    ADENOIDECTOMY      BACK SURGERY      BONE MARROW HARVEST      from hip to put hin his finger     "TONSILLECTOMY         Family History   Problem Relation Age of Onset    Thyroid disease Mother     Kidney cancer Father        Social History     Tobacco Use    Smoking status: Never    Smokeless tobacco: Former   Vaping Use    Vaping status: Some Days    Substances: THC, CBD   Substance Use Topics    Alcohol use: Yes     Comment: occ    Drug use: Yes     Types: Marijuana        OBJECTIVE:    Vital Signs:   Vitals:    06/05/25 0916   BP: 116/78   Pulse: 70   SpO2: 99%   Weight: 107 kg (235 lb 14.3 oz)   Height: 180.3 cm (71\")       Physical Exam:       Lungs-clear  Heart-regular rate  Abdomen-tender right upper quadrant epigastric region.  Unable to palpate a hernia    Results Review:   I reviewed the patient's new clinical results.    Review of Systems was reviewed and confirmed as accurate as documented by the MA.    ASSESSMENT/PLAN:    1. Gastroesophageal reflux disease, unspecified whether esophagitis present    2. Right upper quadrant abdominal pain        Patient is scheduled for EGD with biopsy in 2 weeks.  Risk of bleeding and perforation discussed and patient agreeable.  Continue Protonix daily avoid caffeine alcohol and nicotine.  Also be scheduled for ultrasound of the right upper quadrant to make sure he does not have a small incisional hernia at the trocar site.  Patient agreeable to proceed with the plan.          Electronically signed by Jhoana Blanca MD  06/05/25          "

## 2025-06-05 ENCOUNTER — OFFICE VISIT (OUTPATIENT)
Dept: SURGERY | Facility: CLINIC | Age: 35
End: 2025-06-05
Payer: COMMERCIAL

## 2025-06-05 VITALS
HEIGHT: 71 IN | DIASTOLIC BLOOD PRESSURE: 78 MMHG | HEART RATE: 70 BPM | BODY MASS INDEX: 33.02 KG/M2 | WEIGHT: 235.89 LBS | OXYGEN SATURATION: 99 % | SYSTOLIC BLOOD PRESSURE: 116 MMHG

## 2025-06-05 DIAGNOSIS — R10.11 RIGHT UPPER QUADRANT ABDOMINAL PAIN: ICD-10-CM

## 2025-06-05 DIAGNOSIS — K21.9 GASTROESOPHAGEAL REFLUX DISEASE, UNSPECIFIED WHETHER ESOPHAGITIS PRESENT: Primary | ICD-10-CM

## 2025-06-05 PROCEDURE — 99204 OFFICE O/P NEW MOD 45 MIN: CPT | Performed by: SURGERY

## 2025-06-05 PROCEDURE — 1160F RVW MEDS BY RX/DR IN RCRD: CPT | Performed by: SURGERY

## 2025-06-05 PROCEDURE — 1159F MED LIST DOCD IN RCRD: CPT | Performed by: SURGERY

## 2025-06-05 RX ORDER — FAMOTIDINE 20 MG/1
20 TABLET, FILM COATED ORAL 2 TIMES DAILY
COMMUNITY
Start: 2025-05-21

## 2025-06-05 RX ORDER — ALUMINUM HYDROXIDE AND MAGNESIUM CARBONATE 160; 105 MG/1; MG/1
2 TABLET, CHEWABLE ORAL
COMMUNITY
Start: 2025-02-21

## 2025-06-05 RX ORDER — LEVOCETIRIZINE DIHYDROCHLORIDE 5 MG/1
5 TABLET, FILM COATED ORAL EVERY EVENING
COMMUNITY

## 2025-06-05 RX ORDER — DICYCLOMINE HCL 20 MG
20 TABLET ORAL EVERY 6 HOURS
COMMUNITY

## 2025-06-05 RX ORDER — NAPROXEN 500 MG/1
500 TABLET ORAL 2 TIMES DAILY WITH MEALS
COMMUNITY

## 2025-06-05 RX ORDER — LORATADINE 10 MG/1
10 TABLET ORAL DAILY
COMMUNITY
Start: 2025-02-11

## 2025-06-05 RX ORDER — SIMETHICONE 80 MG
80 TABLET,CHEWABLE ORAL EVERY 6 HOURS PRN
COMMUNITY
Start: 2024-08-16 | End: 2025-08-16

## 2025-06-05 RX ORDER — ALLOPURINOL 100 MG/1
100 TABLET ORAL DAILY
COMMUNITY
Start: 2025-05-21

## 2025-06-05 RX ORDER — NALTREXONE HYDROCHLORIDE 50 MG/1
50 TABLET, FILM COATED ORAL DAILY
COMMUNITY
Start: 2024-12-17

## 2025-06-05 RX ORDER — FLUTICASONE PROPIONATE 50 MCG
1 SPRAY, SUSPENSION (ML) NASAL DAILY
COMMUNITY

## 2025-06-05 RX ORDER — PANTOPRAZOLE SODIUM 40 MG/1
40 TABLET, DELAYED RELEASE ORAL DAILY
COMMUNITY
Start: 2024-08-16 | End: 2025-08-16

## 2025-06-18 NOTE — H&P (VIEW-ONLY)
Patient: Jose Nunes  YOB: 1990    Date: 06/25/2025    Primary Care Provider: Amos Yost PA    Chief Complaint   Patient presents with    Follow-up     EGD       History: The patient is in the office today in follow up from EGD.  Esophagus biopsy pathology was reviewed and indicated unremarkable squamous mucosa, duodenum biopsy pathology indicated a well differentiated neuroendocrine tumor .  He is also here for ultrasound to rule our an incisional hernia.  Ultrasound today negative for hernia.  Previous cholecystectomy.  Patient will need workup of duodenal neuroendocrine tumor.    The following portions of the patient's history were reviewed and updated as appropriate: allergies, current medications, past family history, past medical history, past social history, past surgical history and problem list.    Review of Systems   Constitutional:  Negative for chills, fever and unexpected weight change.   HENT:  Negative for trouble swallowing and voice change.    Eyes:  Negative for visual disturbance.   Respiratory:  Negative for apnea, cough, chest tightness, shortness of breath and wheezing.    Cardiovascular:  Negative for chest pain, palpitations and leg swelling.   Gastrointestinal:  Negative for abdominal distention, abdominal pain, anal bleeding, blood in stool, constipation, diarrhea, nausea, rectal pain and vomiting.   Endocrine: Negative for cold intolerance and heat intolerance.   Genitourinary:  Negative for difficulty urinating, dysuria, flank pain, scrotal swelling and testicular pain.   Musculoskeletal:  Negative for back pain, gait problem and joint swelling.   Skin:  Negative for color change, rash and wound.   Neurological:  Negative for dizziness, syncope, speech difficulty, weakness, numbness and headaches.   Hematological:  Negative for adenopathy. Does not bruise/bleed easily.   Psychiatric/Behavioral:  Negative for confusion. The patient is not nervous/anxious.   "      Vital Signs  Vitals:    06/25/25 0842   BP: 142/82   Pulse: 81   Temp: 98.2 °F (36.8 °C)   TempSrc: Infrared   SpO2: 98%   Weight: 108 kg (237 lb)   Height: 180.3 cm (70.98\")       Allergies:  Allergies   Allergen Reactions    Hydrocodone Itching       Medications:    Current Outpatient Medications:     allopurinol (ZYLOPRIM) 100 MG tablet, Take 1 tablet by mouth Daily., Disp: , Rfl:     aluminum hydroxide-mag carbonate (Gaviscon Extra Strength) 160-105 MG chewable tablet chewable tablet, Chew 2 tablets 4 (Four) Times a Day With Meals & at Bedtime., Disp: , Rfl:     Cholecalciferol 125 MCG (5000 UT) tablet, Take 1 tablet by mouth Daily., Disp: , Rfl:     colchicine 0.6 MG tablet, Take 1 tablet by mouth Daily., Disp: 5 tablet, Rfl: 0    cyanocobalamin (VITAMIN B-12) 500 MCG tablet, Take 1 tablet by mouth Daily., Disp: , Rfl:     dicyclomine (BENTYL) 20 MG tablet, Take 1 tablet by mouth Every 6 (Six) Hours., Disp: , Rfl:     famotidine (PEPCID) 20 MG tablet, Take 1 tablet by mouth 2 (Two) Times a Day., Disp: , Rfl:     fluticasone (FLONASE) 50 MCG/ACT nasal spray, Administer 1 spray into the nostril(s) as directed by provider Daily., Disp: , Rfl:     levocetirizine (XYZAL) 5 MG tablet, Take 1 tablet by mouth Every Evening., Disp: , Rfl:     loratadine (CLARITIN) 10 MG tablet, Take 1 tablet by mouth Daily., Disp: , Rfl:     naltrexone (DEPADE) 50 MG tablet, Take 1 tablet by mouth Daily., Disp: , Rfl:     naproxen (NAPROSYN) 500 MG tablet, Take 1 tablet by mouth 2 (Two) Times a Day With Meals., Disp: , Rfl:     omeprazole (priLOSEC) 20 MG capsule, Take 2 capsules by mouth., Disp: , Rfl:     pantoprazole (PROTONIX) 40 MG EC tablet, Take 1 tablet by mouth Daily., Disp: , Rfl:     simethicone (MYLICON) 80 MG chewable tablet, Chew 1 tablet Every 6 (Six) Hours As Needed., Disp: , Rfl:     Physical Exam:    Abdomen-soft, nondistended tender right upper quadrant  Lungs-clear  Heart-regular rate without murmur     Results " Review:   I reviewed the patient's new clinical results.     Review of Systems was reviewed and confirmed as accurate as documented by the MA.    ASSESSMENT/PLAN:    1. Right upper quadrant abdominal pain    2. Neuroendocrine tumor       Long discussion with patient concerning his neuroendocrine tumor of the duodenal bulb.  We will make an attempt to excise the tumor  endoscopically.  Patient will schedule CT scan abdomen pelvis to evaluate possible spread of the tumor or other locations.  Risk of bleeding and perforation discussed and patient agreeable.  If unable to remove the tumor endoscopically, will require open surgical exploration and removal.    Electronically signed by Jhoana Blanca MD  06/25/25

## 2025-06-19 ENCOUNTER — ANESTHESIA EVENT (OUTPATIENT)
Dept: ENDOSCOPY | Facility: HOSPITAL | Age: 35
End: 2025-06-19
Payer: MEDICAID

## 2025-06-19 ENCOUNTER — OUTSIDE FACILITY SERVICE (OUTPATIENT)
Dept: SURGERY | Facility: CLINIC | Age: 35
End: 2025-06-19
Payer: COMMERCIAL

## 2025-06-19 ENCOUNTER — ANESTHESIA (OUTPATIENT)
Dept: ENDOSCOPY | Facility: HOSPITAL | Age: 35
End: 2025-06-19
Payer: MEDICAID

## 2025-06-19 ENCOUNTER — HOSPITAL ENCOUNTER (OUTPATIENT)
Facility: HOSPITAL | Age: 35
Setting detail: OUTPATIENT SURGERY
Discharge: HOME | End: 2025-06-19
Attending: SURGERY | Admitting: SURGERY
Payer: MEDICAID

## 2025-06-19 VITALS
WEIGHT: 235 LBS | TEMPERATURE: 98.3 F | RESPIRATION RATE: 20 BRPM | HEIGHT: 71 IN | BODY MASS INDEX: 32.9 KG/M2 | OXYGEN SATURATION: 100 % | HEART RATE: 58 BPM | DIASTOLIC BLOOD PRESSURE: 77 MMHG | SYSTOLIC BLOOD PRESSURE: 111 MMHG

## 2025-06-19 PROCEDURE — 7100000010 HC PHASE II RECOVERY - FIRST 15 MIN: Performed by: SURGERY

## 2025-06-19 PROCEDURE — 7100000011 HC PHASE II RECOVERY - ADDTL 15 MIN: Performed by: SURGERY

## 2025-06-19 PROCEDURE — 3700000000 HC ANESTHESIA ATTENDED CARE: Performed by: SURGERY

## 2025-06-19 PROCEDURE — 43239 EGD BIOPSY SINGLE/MULTIPLE: CPT | Performed by: SURGERY

## 2025-06-19 PROCEDURE — 2580000003 HC RX 258: Performed by: SURGERY

## 2025-06-19 PROCEDURE — 3700000001 HC ADD 15 MINUTES (ANESTHESIA): Performed by: SURGERY

## 2025-06-19 PROCEDURE — 3609012400 HC EGD TRANSORAL BIOPSY SINGLE/MULTIPLE: Performed by: SURGERY

## 2025-06-19 PROCEDURE — 2709999900 HC NON-CHARGEABLE SUPPLY: Performed by: SURGERY

## 2025-06-19 PROCEDURE — 6360000002 HC RX W HCPCS: Performed by: NURSE ANESTHETIST, CERTIFIED REGISTERED

## 2025-06-19 RX ORDER — SODIUM CHLORIDE, SODIUM LACTATE, POTASSIUM CHLORIDE, CALCIUM CHLORIDE 600; 310; 30; 20 MG/100ML; MG/100ML; MG/100ML; MG/100ML
INJECTION, SOLUTION INTRAVENOUS CONTINUOUS
Status: DISCONTINUED | OUTPATIENT
Start: 2025-06-19 | End: 2025-06-19 | Stop reason: HOSPADM

## 2025-06-19 RX ORDER — PROPOFOL 10 MG/ML
INJECTION, EMULSION INTRAVENOUS
Status: DISCONTINUED | OUTPATIENT
Start: 2025-06-19 | End: 2025-06-19 | Stop reason: SDUPTHER

## 2025-06-19 RX ORDER — LEVOCETIRIZINE DIHYDROCHLORIDE 5 MG/1
5 TABLET, FILM COATED ORAL EVERY EVENING
COMMUNITY

## 2025-06-19 RX ORDER — LIDOCAINE HYDROCHLORIDE 20 MG/ML
INJECTION, SOLUTION INFILTRATION; PERINEURAL
Status: DISCONTINUED | OUTPATIENT
Start: 2025-06-19 | End: 2025-06-19 | Stop reason: SDUPTHER

## 2025-06-19 RX ADMIN — PROPOFOL 50 MG: 10 INJECTION, EMULSION INTRAVENOUS at 11:01

## 2025-06-19 RX ADMIN — PROPOFOL 100 MG: 10 INJECTION, EMULSION INTRAVENOUS at 10:58

## 2025-06-19 RX ADMIN — SODIUM CHLORIDE, SODIUM LACTATE, POTASSIUM CHLORIDE, AND CALCIUM CHLORIDE: .6; .31; .03; .02 INJECTION, SOLUTION INTRAVENOUS at 10:42

## 2025-06-19 RX ADMIN — PROPOFOL 30 MG: 10 INJECTION, EMULSION INTRAVENOUS at 11:02

## 2025-06-19 RX ADMIN — LIDOCAINE HYDROCHLORIDE 40 MG: 20 INJECTION, SOLUTION INFILTRATION; PERINEURAL at 10:59

## 2025-06-19 RX ADMIN — PROPOFOL 100 MG: 10 INJECTION, EMULSION INTRAVENOUS at 11:00

## 2025-06-19 NOTE — H&P
HISTORY & PHYSICAL      Patient Name: Brandon Noble      Medical Record Number: [id number]       Date of Service: 6/19/2025      I have reviewed the consult or history and physical from 6/12/2025.  There have been no significant changes in the patient's history or exam.  There have been no changes in the patient's medications.    Past Surgical History:   Procedure Laterality Date    BONE MARROW HARVEST      as a child from hip to right hand middle finger    COLONOSCOPY  2017 [place]    UPPER GASTROINTESTINAL ENDOSCOPY  Around 2023??        Past Medical History:   Diagnosis Date    Anxiety Years back    Always very anxious. Gotten worse since rhis stomach isssue going on. Would like to discuss with jesús potentially an anxiety medication    Asthma     Sever astmah as a child. Still short winded at tomes    GERD (gastroesophageal reflux disease)     Headache     Have worsend in the last year. I belive due to high blood pressure mostly.    Hypertension Late 2023 early 2024    This is something i would like to discuss worh jesús about seeing if i need a medication. I have been a heavy drinker but that is something i have been gradually woeking on quitting    Liver disease Sept 2023    Fatty liver ( not sure if a liver diesease) this is something that keeps my anxiety up worried abojt it. Would like to follow up and talk about also    Murmur, cardiac     as a child    Neck pain, musculoskeletal     3 bulging disk        Current Facility-Administered Medications   Medication Dose Route Frequency Provider Last Rate Last Admin    lactated ringers infusion   IntraVENous Continuous Sydnee Trinidad MD 80 mL/hr at 06/19/25 1051 NoRateChange at 06/19/25 1051        Electronically signed by Sydnee Trinidad MD on 6/19/2025 at 11:20 AM

## 2025-06-19 NOTE — ANESTHESIA PRE PROCEDURE
Department of Anesthesiology  Preprocedure Note       Name:  Brandon Noble   Age:  35 y.o.  :  1990                                          MRN:  [id number]         Date:  2025      Surgeon: Surgeon(s):  Sydnee Trinidad MD    Procedure: Procedure(s):  ESOPHAGOGASTRODUODENOSCOPY BIOPSY    Medications prior to admission:   Prior to Admission medications    Medication Sig Start Date End Date Taking? Authorizing Provider   cyanocobalamin 500 MCG tablet Take 1 tablet by mouth daily    Ok Kuo MD   levocetirizine (XYZAL) 5 MG tablet Take 1 tablet by mouth every evening    Ok Kuo MD   allopurinol (ZYLOPRIM) 100 MG tablet Take 1 tablet by mouth daily 25   Everardo Riley PA   famotidine (PEPCID) 20 MG tablet Take 1 tablet by mouth 2 times daily 25   Everardo Riley PA   fluticasone (FLONASE) 50 MCG/ACT nasal spray     Ok Kuo MD   naproxen (NAPROSYN) 500 MG tablet Take 1 tablet by mouth 2 times daily (with meals)    Ok Kuo MD   pantoprazole (PROTONIX) 40 MG tablet Take 1 tablet by mouth daily 25   Everardo Riley PA   loratadine (CLARITIN) 10 MG tablet Take 1 tablet by mouth daily allergies 25   Everardo Riley PA       Current medications:    Current Facility-Administered Medications   Medication Dose Route Frequency Provider Last Rate Last Admin   • lactated ringers infusion   IntraVENous Continuous Sydnee Trinidad MD 80 mL/hr at 25 1042 New Bag at 25 1042       Allergies:    Allergies   Allergen Reactions   • Grass Pollen(K-O-R-T-Swt Ignacio)    • Hydrocodone        Problem List:    Patient Active Problem List   Diagnosis Code   • Nerve pain M79.2   • Fatty liver K76.0   • Anxiety F41.9   • Gout M10.9   • Seasonal allergies J30.2   • Gastroesophageal reflux disease without esophagitis K21.9   • Elevated BP without diagnosis of hypertension R03.0       Past Medical History:        Diagnosis Date   • Anxiety Years back

## 2025-06-19 NOTE — PROGRESS NOTES
Pt awake.  No complaints of pain or nausea.  Abd soft.  +BS. No trouble swallowing.  Side rails up x2.  Tolerates CL well.  Verbalizes understanding of d/c instructions.

## 2025-06-19 NOTE — ANESTHESIA POSTPROCEDURE EVALUATION
Department of Anesthesiology  Postprocedure Note    Patient: Brandon Noble  MRN: [id number]  YOB: 1990  Date of evaluation: 6/19/2025    Procedure Summary       Date: 06/19/25 Room / Location: [address]    Anesthesia Start: 1051 Anesthesia Stop: 1113    Procedure: ESOPHAGOGASTRODUODENOSCOPY BIOPSY Diagnosis:       Gastroesophageal reflux disease, unspecified whether esophagitis present      RUQ pain    Surgeons: Sydnee Trinidad MD Responsible Provider: Lata Koch APRN - CRNA    Anesthesia Type: MAC ASA Status: 2            Anesthesia Type: No value filed.    Luis Phase I: Luis Score: 10    Luis Phase II:      Anesthesia Post Evaluation    Patient location during evaluation: bedside  Patient participation: complete - patient participated  Level of consciousness: awake  Pain score: 0  Airway patency: patent  Nausea & Vomiting: no nausea  Cardiovascular status: blood pressure returned to baseline and hemodynamically stable  Respiratory status: acceptable and room air  Hydration status: euvolemic  Pain management: adequate    No notable events documented.

## 2025-06-19 NOTE — OP NOTE
PATIENT:    Brandon Noble    DATE OF SURGERY:  06/19/25    PHYSICIAN:    Sydnee Trinidad MD, MD, FACS    REFERRING PHYSICIAN:  Everardo Riley PA    YOB: 1990    PREOPERATIVE DIAGNOSIS: Reflux esophagitis, GERD    POSTOPERATIVE DIAGNOSIS: Duodenal bulb nodule, mild gastritis and esophagitis      Estimated blood loss: None    PROCEDURE:  Esophagogastroduodenoscopy with biopsy.     HISTORY:  The patient was sent to me as a consultation via Everardo Riley PA for evaluation and treatment of the above-mentioned symptomatology.  The patient is here now today for elective upper endoscopy with biopsy.  I did meet with the patient preoperatively who understands the full risks and benefits of the above-mentioned procedure.  We will proceed with this today on an elective basis.      ANESTHESIA:  The patient was monitored both preoperatively and postoperatively in the normal fashion from a cardiovascular standpoint.  Oxygen was delivered at 2 liters per nasal cannula, and oxygen saturations were monitored during the procedure.     OPERATIVE PROCEDURE:  The patient was taken to the endoscopy unit and placed in the supine position and given anesthesia as mentioned above.  The patient was then placed in the left lateral decubitus position and the scope was introduced into the patient’s mouth and into the esophagus.      The findings were as follows:  Esophagus - the esophagus was entered without difficulty.  Good visualization was obtained from the oropharyngeal region down to the gastroesophageal junction.  There was no evidence of esophageal masses, stricture, or extrinsic compression.  Biopsy x 2 obtained of the distal esophagus.  Rule out Adames's esophagus    Stomach - the stomach was entered without difficulty.  There was excellent visualization obtained of all mucosal surfaces.  A retroflexed view was obtained and this showed no evidence of acute abnormalities.  There was no evidence of intragastric masses,

## 2025-06-19 NOTE — PROGRESS NOTES
Pt arrives ambulatory.  No complaints noted.  Verifies he is here for an EGD with Dr. Trinidad.  States prep was effective.  Verbalizes understanding of procedure.  Consent on chart.  Verbalizes understanding of d/c instructions.  HR regular.  Lungs clear.  +BS.  Denies chest pain or SOA.  States he is here for abdominal discomfort.  States wife will drive him home post-procedure.  Side rails up x 2.

## 2025-06-25 ENCOUNTER — HOSPITAL ENCOUNTER (OUTPATIENT)
Dept: CT IMAGING | Facility: HOSPITAL | Age: 35
Discharge: HOME | End: 2025-06-25
Admitting: SURGERY
Payer: COMMERCIAL

## 2025-06-25 ENCOUNTER — OFFICE VISIT (OUTPATIENT)
Dept: SURGERY | Facility: CLINIC | Age: 35
End: 2025-06-25
Payer: COMMERCIAL

## 2025-06-25 VITALS
WEIGHT: 237 LBS | DIASTOLIC BLOOD PRESSURE: 82 MMHG | SYSTOLIC BLOOD PRESSURE: 142 MMHG | BODY MASS INDEX: 33.18 KG/M2 | HEART RATE: 81 BPM | OXYGEN SATURATION: 98 % | TEMPERATURE: 98.2 F | HEIGHT: 71 IN

## 2025-06-25 DIAGNOSIS — D3A.8 NEUROENDOCRINE TUMOR: Primary | ICD-10-CM

## 2025-06-25 DIAGNOSIS — D3A.8 NEUROENDOCRINE TUMOR: ICD-10-CM

## 2025-06-25 DIAGNOSIS — R10.11 RIGHT UPPER QUADRANT ABDOMINAL PAIN: Primary | ICD-10-CM

## 2025-06-25 PROCEDURE — 74176 CT ABD & PELVIS W/O CONTRAST: CPT

## 2025-06-25 PROCEDURE — 1159F MED LIST DOCD IN RCRD: CPT | Performed by: SURGERY

## 2025-06-25 PROCEDURE — 1160F RVW MEDS BY RX/DR IN RCRD: CPT | Performed by: SURGERY

## 2025-06-25 PROCEDURE — 99214 OFFICE O/P EST MOD 30 MIN: CPT | Performed by: SURGERY

## 2025-07-01 NOTE — PRE-PROCEDURE INSTRUCTIONS
PAT phone history completed with patient for upcoming procedure on 7/3/25 with Dr. Blanca.    PAT PASS reviewed with patient and they verbalize understanding of the following:     Do not eat or drink anything after midnight the night before procedure unless otherwise instructed by physician/surgeon's office, this includes no gum, candy, mints, tobacco products or e-cigarettes.  Do not shave the area to be operated on at least 48 hours prior to procedure.  Do not wear makeup, lotion, hair products, or nail polish.  Do not wear any jewelry and remove all piercings.  Do not wear any adhesive if you wear dentures.  Do not wear contacts; bring in glasses if needed.  Only take medications on the morning of procedure as instructed by PAT nurse per anesthesia guidelines or as instructed by physician's office.  If you are on any blood thinners reach out to the physician/surgeon's office for instructions on when/if they will need to be stopped prior to procedure.  Bring in picture ID and insurance card, advanced directive copies if applicable, CPAP/BIPAP/Inhalers if indicated morning of procedure, leave any other valuables at home.  Ensure you have arranged for someone to drive you home the day of your procedure and someone to care for you at home afterwards. It is recommended that you do not drive, drink alcohol, or make any major legal decisions for at least 24 hours after your procedure is complete.  ERAS instructions given unless otherwise instructed per surgeon's orders.    Instructions given on hospital entrance and registration location.

## 2025-07-03 ENCOUNTER — ANESTHESIA EVENT (OUTPATIENT)
Dept: GASTROENTEROLOGY | Facility: HOSPITAL | Age: 35
End: 2025-07-03
Payer: COMMERCIAL

## 2025-07-03 ENCOUNTER — ANESTHESIA (OUTPATIENT)
Dept: GASTROENTEROLOGY | Facility: HOSPITAL | Age: 35
End: 2025-07-03
Payer: COMMERCIAL

## 2025-07-03 ENCOUNTER — HOSPITAL ENCOUNTER (OUTPATIENT)
Facility: HOSPITAL | Age: 35
Setting detail: HOSPITAL OUTPATIENT SURGERY
Discharge: HOME | End: 2025-07-03
Attending: SURGERY | Admitting: SURGERY
Payer: COMMERCIAL

## 2025-07-03 VITALS
DIASTOLIC BLOOD PRESSURE: 73 MMHG | TEMPERATURE: 97.4 F | OXYGEN SATURATION: 97 % | SYSTOLIC BLOOD PRESSURE: 108 MMHG | RESPIRATION RATE: 16 BRPM | HEART RATE: 62 BPM

## 2025-07-03 DIAGNOSIS — D3A.8 NEUROENDOCRINE TUMOR: ICD-10-CM

## 2025-07-03 DIAGNOSIS — R10.11 RIGHT UPPER QUADRANT ABDOMINAL PAIN: ICD-10-CM

## 2025-07-03 PROCEDURE — 25010000002 FENTANYL CITRATE (PF) 50 MCG/ML SOLUTION PREFILLED SYRINGE: Performed by: NURSE ANESTHETIST, CERTIFIED REGISTERED

## 2025-07-03 PROCEDURE — 25010000002 LIDOCAINE (CARDIAC): Performed by: NURSE ANESTHETIST, CERTIFIED REGISTERED

## 2025-07-03 PROCEDURE — 25810000003 SODIUM CHLORIDE 0.9 % SOLUTION: Performed by: NURSE ANESTHETIST, CERTIFIED REGISTERED

## 2025-07-03 PROCEDURE — 43254 EGD ENDO MUCOSAL RESECTION: CPT | Performed by: SURGERY

## 2025-07-03 PROCEDURE — 25010000002 GLYCOPYRROLATE PF 0.4 MG/2ML SOLUTION: Performed by: NURSE ANESTHETIST, CERTIFIED REGISTERED

## 2025-07-03 PROCEDURE — 25010000002 MIDAZOLAM PER 1MG: Performed by: NURSE ANESTHETIST, CERTIFIED REGISTERED

## 2025-07-03 PROCEDURE — 25010000002 PROPOFOL 10 MG/ML EMULSION: Performed by: NURSE ANESTHETIST, CERTIFIED REGISTERED

## 2025-07-03 DEVICE — DEV CLIP ENDO RESOLUTION360 CONTRL ROT 235CM: Type: IMPLANTABLE DEVICE | Site: DUODENUM | Status: FUNCTIONAL

## 2025-07-03 RX ORDER — ONDANSETRON 2 MG/ML
4 INJECTION INTRAMUSCULAR; INTRAVENOUS ONCE AS NEEDED
Status: DISCONTINUED | OUTPATIENT
Start: 2025-07-03 | End: 2025-07-03 | Stop reason: HOSPADM

## 2025-07-03 RX ORDER — FENTANYL CITRATE 50 UG/ML
INJECTION, SOLUTION INTRAMUSCULAR; INTRAVENOUS AS NEEDED
Status: DISCONTINUED | OUTPATIENT
Start: 2025-07-03 | End: 2025-07-03 | Stop reason: SURG

## 2025-07-03 RX ORDER — PROPOFOL 10 MG/ML
VIAL (ML) INTRAVENOUS AS NEEDED
Status: DISCONTINUED | OUTPATIENT
Start: 2025-07-03 | End: 2025-07-03 | Stop reason: SURG

## 2025-07-03 RX ORDER — MIDAZOLAM HYDROCHLORIDE 2 MG/2ML
INJECTION, SOLUTION INTRAMUSCULAR; INTRAVENOUS AS NEEDED
Status: DISCONTINUED | OUTPATIENT
Start: 2025-07-03 | End: 2025-07-03 | Stop reason: SURG

## 2025-07-03 RX ORDER — SODIUM CHLORIDE 9 MG/ML
INJECTION, SOLUTION INTRAVENOUS CONTINUOUS PRN
Status: DISCONTINUED | OUTPATIENT
Start: 2025-07-03 | End: 2025-07-03 | Stop reason: SURG

## 2025-07-03 RX ADMIN — GLYCOPYRROLATE 0.2 MG: 0.2 INJECTION, SOLUTION INTRAMUSCULAR; INTRAVENOUS at 11:08

## 2025-07-03 RX ADMIN — PROPOFOL 50 MG: 10 INJECTION, EMULSION INTRAVENOUS at 11:08

## 2025-07-03 RX ADMIN — MIDAZOLAM HYDROCHLORIDE 2 MG: 1 INJECTION, SOLUTION INTRAMUSCULAR; INTRAVENOUS at 11:08

## 2025-07-03 RX ADMIN — PROPOFOL 50 MG: 10 INJECTION, EMULSION INTRAVENOUS at 11:14

## 2025-07-03 RX ADMIN — LIDOCAINE HYDROCHLORIDE 60 MG: 20 INJECTION, SOLUTION INTRAVENOUS at 11:08

## 2025-07-03 RX ADMIN — PROPOFOL 50 MG: 10 INJECTION, EMULSION INTRAVENOUS at 11:11

## 2025-07-03 RX ADMIN — SODIUM CHLORIDE: 9 INJECTION, SOLUTION INTRAVENOUS at 10:55

## 2025-07-03 RX ADMIN — FENTANYL CITRATE 50 MCG: 50 INJECTION, SOLUTION INTRAMUSCULAR; INTRAVENOUS at 11:08

## 2025-07-03 RX ADMIN — PROPOFOL 50 MG: 10 INJECTION, EMULSION INTRAVENOUS at 11:18

## 2025-07-03 NOTE — ANESTHESIA POSTPROCEDURE EVALUATION
Patient: Jose Nunes    Procedure Summary       Date: 07/03/25 Room / Location: [facility] / [facility]    Anesthesia Start: 1102 Anesthesia Stop: 1119    Procedure: ESOPHAGOGASTRODUODENOSCOPY with biopsy and resolution clip (Esophagus) Diagnosis:       Right upper quadrant abdominal pain      Neuroendocrine tumor      (Right upper quadrant abdominal pain [R10.11])      (Neuroendocrine tumor [D3A.8])    Surgeons: Jhoana Blanca MD Provider: Avery Acosta CRNA    Anesthesia Type: MAC ASA Status: 2            Anesthesia Type: MAC    Vitals  No vitals data found for the desired time range.  BP 93/52  HR 70  RR 14  SpO2 99%        Post Anesthesia Care and Evaluation    Patient location during evaluation: PHASE II  Patient participation: complete - patient participated  Level of consciousness: awake and alert  Pain score: 0  Pain management: adequate    Airway patency: patent  Anesthetic complications: No anesthetic complications  PONV Status: none  Cardiovascular status: acceptable, stable and hemodynamically stable  Respiratory status: acceptable, spontaneous ventilation, nasal cannula and nonlabored ventilation  Hydration status: acceptable

## 2025-07-03 NOTE — ANESTHESIA PREPROCEDURE EVALUATION
Anesthesia Evaluation     Patient summary reviewed and Nursing notes reviewed   NPO Solid Status: > 8 hours  NPO Liquid Status: > 8 hours           Airway   Mallampati: II  TM distance: >3 FB  Neck ROM: full  Possible difficult intubation  Dental - normal exam     Pulmonary - normal exam   (+) asthma,  Cardiovascular - normal exam  Exercise tolerance: excellent (>7 METS)    Rhythm: regular  Rate: normal    (+) valvular problems/murmurs      Neuro/Psych- negative ROS  GI/Hepatic/Renal/Endo    (+) GERD    Musculoskeletal (-) negative ROS    Abdominal  - normal exam   Substance History   (+) alcohol use     OB/GYN negative ob/gyn ROS         Other      history of cancer           No current facility-administered medications on file prior to encounter.     Current Outpatient Medications on File Prior to Encounter   Medication Sig Dispense Refill   • allopurinol (ZYLOPRIM) 100 MG tablet Take 1 tablet by mouth Daily.     • aluminum hydroxide-mag carbonate (Gaviscon Extra Strength) 160-105 MG chewable tablet chewable tablet Chew 2 tablets 4 (Four) Times a Day With Meals & at Bedtime.     • Cholecalciferol 125 MCG (5000 UT) tablet Take 1 tablet by mouth Daily.     • colchicine 0.6 MG tablet Take 1 tablet by mouth Daily. (Patient taking differently: Take 1 tablet by mouth Daily As Needed.) 5 tablet 0   • cyanocobalamin (VITAMIN B-12) 500 MCG tablet Take 1 tablet by mouth Daily.     • dicyclomine (BENTYL) 20 MG tablet Take 1 tablet by mouth Every 6 (Six) Hours As Needed.     • famotidine (PEPCID) 20 MG tablet Take 1 tablet by mouth 2 (Two) Times a Day.     • fluticasone (FLONASE) 50 MCG/ACT nasal spray Administer 1 spray into the nostril(s) as directed by provider As Needed.     • loratadine (CLARITIN) 10 MG tablet Take 1 tablet by mouth Daily.     • pantoprazole (PROTONIX) 40 MG EC tablet Take 1 tablet by mouth Daily.     • simethicone (MYLICON) 80 MG chewable tablet Chew 1 tablet Every 6 (Six) Hours As Needed.                Anesthesia Plan    ASA 2     MAC     (Risks and benefits discussed including risk of aspiration, recall and dental damage. All patient questions answered.        Will continue with plan of care.)  intravenous induction     Anesthetic plan, risks, benefits, and alternatives have been provided, discussed and informed consent has been obtained with: patient.  Pre-procedure education provided  Plan discussed with CRNA.    CODE STATUS:

## 2025-07-03 NOTE — DISCHARGE INSTRUCTIONS
COMMON SIDE EFFECTS OF SEDATION  Nausea and Vomiting- often occurs within the first few hours after surgery  Dry Mouth- due to reduced production of saliva during surgery  Drowsiness- may last a few hours after surgery or even into the next day  Chills or Shivering- due to body temperature changes during surgery  Temporary Confusion or Disorientation- most common in older adults or with higher sedation doses  Dizziness- often occurs when moving too quickly after waking    Rest today  No pushing,pulling,tugging,heavy lifting, or strenuous activity   No major decision making,driving,or drinking alcoholic beverages for 24 hours due to the sedation you received  Always use good hand hygiene/washing technique  No driving on pain medication.    To assist you in voiding:  Drink plenty of fluids  Listen to running water while attempting to void.    If you are unable to urinate and you have an uncomfortable urge to void or it has been   6 hours since you were discharged, return to the Emergency Room

## 2025-07-08 ENCOUNTER — HOSPITAL ENCOUNTER (OUTPATIENT)
Facility: HOSPITAL | Age: 35
Discharge: HOME | End: 2025-07-08
Payer: MEDICAID

## 2025-07-08 ENCOUNTER — OFFICE VISIT (OUTPATIENT)
Age: 35
End: 2025-07-08
Payer: MEDICAID

## 2025-07-08 VITALS
WEIGHT: 229 LBS | SYSTOLIC BLOOD PRESSURE: 130 MMHG | OXYGEN SATURATION: 98 % | HEART RATE: 71 BPM | BODY MASS INDEX: 31.94 KG/M2 | DIASTOLIC BLOOD PRESSURE: 81 MMHG

## 2025-07-08 DIAGNOSIS — Z91.09 ENVIRONMENTAL ALLERGIES: ICD-10-CM

## 2025-07-08 DIAGNOSIS — E55.9 VITAMIN D DEFICIENCY: ICD-10-CM

## 2025-07-08 DIAGNOSIS — E51.9 THIAMINE DEFICIENCY: ICD-10-CM

## 2025-07-08 DIAGNOSIS — E78.5 HYPERLIPIDEMIA, UNSPECIFIED HYPERLIPIDEMIA TYPE: ICD-10-CM

## 2025-07-08 DIAGNOSIS — M79.2 NERVE PAIN: ICD-10-CM

## 2025-07-08 DIAGNOSIS — R91.1 PULMONARY NODULE: Primary | ICD-10-CM

## 2025-07-08 DIAGNOSIS — C7A.8: ICD-10-CM

## 2025-07-08 LAB
25(OH)D3 SERPL-MCNC: 44.5 NG/ML (ref 32–100)
CHOLEST SERPL-MCNC: 259 MG/DL (ref 0–200)
HDLC SERPL-MCNC: 48 MG/DL (ref 40–60)
LDLC SERPL CALC-MCNC: 184 MG/DL
REF LAB TEST METHOD: NORMAL
TRIGL SERPL-MCNC: 135 MG/DL (ref 0–249)
VLDLC SERPL CALC-MCNC: 27 MG/DL

## 2025-07-08 PROCEDURE — 1036F TOBACCO NON-USER: CPT | Performed by: PHYSICIAN ASSISTANT

## 2025-07-08 PROCEDURE — G8427 DOCREV CUR MEDS BY ELIG CLIN: HCPCS | Performed by: PHYSICIAN ASSISTANT

## 2025-07-08 PROCEDURE — 99214 OFFICE O/P EST MOD 30 MIN: CPT | Performed by: PHYSICIAN ASSISTANT

## 2025-07-08 PROCEDURE — 80061 LIPID PANEL: CPT

## 2025-07-08 PROCEDURE — G8417 CALC BMI ABV UP PARAM F/U: HCPCS | Performed by: PHYSICIAN ASSISTANT

## 2025-07-08 PROCEDURE — 84425 ASSAY OF VITAMIN B-1: CPT

## 2025-07-08 PROCEDURE — 82306 VITAMIN D 25 HYDROXY: CPT

## 2025-07-08 RX ORDER — LORATADINE 10 MG/1
10 TABLET ORAL DAILY
Qty: 30 TABLET | Refills: 3 | Status: SHIPPED | OUTPATIENT
Start: 2025-07-08

## 2025-07-08 RX ORDER — PANTOPRAZOLE SODIUM 40 MG/1
40 TABLET, DELAYED RELEASE ORAL DAILY
Qty: 30 TABLET | Refills: 2 | Status: SHIPPED | OUTPATIENT
Start: 2025-07-08

## 2025-07-08 ASSESSMENT — ENCOUNTER SYMPTOMS
RESPIRATORY NEGATIVE: 1
GASTROINTESTINAL NEGATIVE: 1

## 2025-07-08 NOTE — PROGRESS NOTES
Chief Complaint   Patient presents with    Follow-up     Pt here for follow up. He had and Endoscopy done with  and said he had something removed during and wanted to discuss.        Have you seen any other physician or provider since your last visit yes - Gastro, General Surgery    Have you had any other diagnostic tests since your last visit? yes - See labs and imaging    Have you changed or stopped any medications since your last visit? no

## 2025-07-08 NOTE — PROGRESS NOTES
Subjective:     Brandon Noble is a 35 y.o. male.    Chief Complaint   Patient presents with    Follow-up     Pt here for follow up. He had and Endoscopy done with  and said he had something removed during and wanted to discuss.        Pt here for follow up on chronic conditions. He did recently have nodule bx and removed from duodenum. 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 Has follow up with Dr. López tomorrow. Does have known history of stable RLL pulmonary nodule. Still continuing to have RUQ/epigastric pain. Has not really improved. GERD has been worse over the last few days. Gout controlled. Allergies well controlled. Still having shocking feelings. Worse when at rest, does not notice much when up moving. Sxs are almost every day. Has been off gabapentin for several years. Does smoke weed. Has not had any EtOH since Jan 29, 2025. Has been having increased anxiety, but does not want any medications at this time.     Chronic conditions:    GERD - protonix    Gout - allopurinol, naproxen. Does not get flares often.     Seasonal allergies - claritin     Nerve pain in the past - was on spencer in the past. Was shock feeling over body, perla on arms. Did have back surgery in 2018, may have been related to that. L spine disc.     His of cholecystectomy - was on colestipol and bentyl in the past. Does follow with GI at .     Fatty liver     Anxiety - not on medications.     Right lung nodule incidentally noted in past - 2/2023.     thiamine deficiency     HLD -     SCREENINGS:    Last Depression Screening - score 0, 2025  10 year ASCVD Risk at 18 y/o q4-6yr - The ASCVD Risk score (Hong POWERS, et al., 2019) failed to calculate for the following reasons:    The 2019 ASCVD risk score is only valid for ages 40 to 79  Diabetes Screening:  DM foot exam- n/a  DM Retinal exam - n/a  Last CRC screen - at 45-74 y/o. 2019. Wnl. Repeat in 10 yr?  PSA - at 50 y/o   Tobacco use- vape occasionally - THC. Social

## 2025-07-09 NOTE — PROGRESS NOTES
"Patient: Jose Nunes  YOB: 1990    Date: 07/10/2025    Primary Care Provider: Amos Yost PA    Chief Complaint   Patient presents with    Follow-up     EGD       History: The patient is in the office today in follow up from EGD.  Pathology was reviewed and indicated Well-differentiated neuroendocrine tumor.  Only one of the 5 specimens taken had a small amount of tumor.  Margins clear.    The following portions of the patient's history were reviewed and updated as appropriate: allergies, current medications, past family history, past medical history, past social history, past surgical history and problem list.    Review of Systems   Constitutional:  Negative for chills, fever and unexpected weight change.   HENT:  Negative for trouble swallowing and voice change.    Eyes:  Negative for visual disturbance.   Respiratory:  Negative for apnea, cough, chest tightness, shortness of breath and wheezing.    Cardiovascular:  Negative for chest pain, palpitations and leg swelling.   Gastrointestinal:  Negative for abdominal distention, abdominal pain, anal bleeding, blood in stool, constipation, diarrhea, nausea, rectal pain and vomiting.   Endocrine: Negative for cold intolerance and heat intolerance.   Genitourinary:  Negative for difficulty urinating, dysuria, flank pain, scrotal swelling and testicular pain.   Musculoskeletal:  Negative for back pain, gait problem and joint swelling.   Skin:  Negative for color change, rash and wound.   Neurological:  Negative for dizziness, syncope, speech difficulty, weakness, numbness and headaches.   Hematological:  Negative for adenopathy. Does not bruise/bleed easily.   Psychiatric/Behavioral:  Negative for confusion. The patient is not nervous/anxious.        Vital Signs  Vitals:    07/10/25 0918   BP: 128/71   Pulse: 82   SpO2: 99%   Weight: 105 kg (231 lb 7.7 oz)   Height: 180.3 cm (70.98\")       Allergies:  Allergies   Allergen Reactions    Hydrocodone " Itching       Medications:    Current Outpatient Medications:     allopurinol (ZYLOPRIM) 100 MG tablet, Take 1 tablet by mouth Daily., Disp: , Rfl:     aluminum hydroxide-mag carbonate (Gaviscon Extra Strength) 160-105 MG chewable tablet chewable tablet, Chew 2 tablets 4 (Four) Times a Day With Meals & at Bedtime., Disp: , Rfl:     Cholecalciferol 125 MCG (5000 UT) tablet, Take 1 tablet by mouth Daily., Disp: , Rfl:     colchicine 0.6 MG tablet, Take 1 tablet by mouth Daily. (Patient taking differently: Take 1 tablet by mouth Daily As Needed.), Disp: 5 tablet, Rfl: 0    cyanocobalamin (VITAMIN B-12) 500 MCG tablet, Take 1 tablet by mouth Daily., Disp: , Rfl:     dicyclomine (BENTYL) 20 MG tablet, Take 1 tablet by mouth Every 6 (Six) Hours As Needed., Disp: , Rfl:     famotidine (PEPCID) 20 MG tablet, Take 1 tablet by mouth 2 (Two) Times a Day., Disp: , Rfl:     fluticasone (FLONASE) 50 MCG/ACT nasal spray, Administer 1 spray into the nostril(s) as directed by provider As Needed., Disp: , Rfl:     loratadine (CLARITIN) 10 MG tablet, Take 1 tablet by mouth Daily., Disp: , Rfl:     pantoprazole (PROTONIX) 40 MG EC tablet, Take 1 tablet by mouth Daily., Disp: , Rfl:     simethicone (MYLICON) 80 MG chewable tablet, Chew 1 tablet Every 6 (Six) Hours As Needed., Disp: , Rfl:     Physical Exam:    Abdomen-soft, nondistended and nontender     Results Review:   I reviewed the patient's new clinical results.     Review of Systems was reviewed and confirmed as accurate as documented by the MA.    ASSESSMENT/PLAN:    1. Neuroendocrine tumor       Recommend repeat EGD in 6 months to rebiopsy the area in the duodenal bulb.  If biopsy negative at that time we will continue yearly surveillance.  Patient also seeing pulmonology for a lung nodule.    Electronically signed by Jhoana Blanca MD  07/10/25

## 2025-07-10 ENCOUNTER — OFFICE VISIT (OUTPATIENT)
Dept: SURGERY | Facility: CLINIC | Age: 35
End: 2025-07-10
Payer: COMMERCIAL

## 2025-07-10 VITALS
HEIGHT: 71 IN | OXYGEN SATURATION: 99 % | BODY MASS INDEX: 32.41 KG/M2 | HEART RATE: 82 BPM | DIASTOLIC BLOOD PRESSURE: 71 MMHG | SYSTOLIC BLOOD PRESSURE: 128 MMHG | WEIGHT: 231.48 LBS

## 2025-07-10 DIAGNOSIS — D3A.8 NEUROENDOCRINE TUMOR: Primary | ICD-10-CM

## 2025-07-10 PROCEDURE — 99212 OFFICE O/P EST SF 10 MIN: CPT | Performed by: SURGERY

## 2025-07-10 PROCEDURE — 1159F MED LIST DOCD IN RCRD: CPT | Performed by: SURGERY

## 2025-07-10 PROCEDURE — 1160F RVW MEDS BY RX/DR IN RCRD: CPT | Performed by: SURGERY

## 2025-07-16 RX ORDER — EZETIMIBE 10 MG/1
10 TABLET ORAL DAILY
Qty: 30 TABLET | Refills: 2 | Status: SHIPPED | OUTPATIENT
Start: 2025-07-16

## 2025-08-13 ENCOUNTER — OFFICE VISIT (OUTPATIENT)
Age: 35
End: 2025-08-13
Payer: MEDICAID

## 2025-08-13 VITALS
BODY MASS INDEX: 31.97 KG/M2 | DIASTOLIC BLOOD PRESSURE: 73 MMHG | HEART RATE: 65 BPM | WEIGHT: 229.2 LBS | OXYGEN SATURATION: 97 % | SYSTOLIC BLOOD PRESSURE: 110 MMHG

## 2025-08-13 DIAGNOSIS — H93.12 TINNITUS OF LEFT EAR: Primary | ICD-10-CM

## 2025-08-13 DIAGNOSIS — H69.92 DYSFUNCTION OF LEFT EUSTACHIAN TUBE: ICD-10-CM

## 2025-08-13 DIAGNOSIS — R42 DIZZINESS: ICD-10-CM

## 2025-08-13 PROCEDURE — G8417 CALC BMI ABV UP PARAM F/U: HCPCS | Performed by: PHYSICIAN ASSISTANT

## 2025-08-13 PROCEDURE — G8427 DOCREV CUR MEDS BY ELIG CLIN: HCPCS | Performed by: PHYSICIAN ASSISTANT

## 2025-08-13 PROCEDURE — 99213 OFFICE O/P EST LOW 20 MIN: CPT | Performed by: PHYSICIAN ASSISTANT

## 2025-08-13 PROCEDURE — 4004F PT TOBACCO SCREEN RCVD TLK: CPT | Performed by: PHYSICIAN ASSISTANT

## 2025-08-13 RX ORDER — PREDNISONE 10 MG/1
10 TABLET ORAL 2 TIMES DAILY
Qty: 10 TABLET | Refills: 0 | Status: SHIPPED | OUTPATIENT
Start: 2025-08-13 | End: 2025-08-18

## 2025-08-13 RX ORDER — FLUTICASONE PROPIONATE 50 MCG
2 SPRAY, SUSPENSION (ML) NASAL DAILY
Qty: 16 G | Refills: 2 | Status: SHIPPED | OUTPATIENT
Start: 2025-08-13

## 2025-08-13 ASSESSMENT — ENCOUNTER SYMPTOMS
RESPIRATORY NEGATIVE: 1
GASTROINTESTINAL NEGATIVE: 1

## 2025-08-21 ENCOUNTER — OFFICE VISIT (OUTPATIENT)
Age: 35
End: 2025-08-21
Payer: MEDICAID

## 2025-08-21 VITALS
BODY MASS INDEX: 31.74 KG/M2 | OXYGEN SATURATION: 95 % | DIASTOLIC BLOOD PRESSURE: 73 MMHG | WEIGHT: 227.6 LBS | SYSTOLIC BLOOD PRESSURE: 111 MMHG | HEART RATE: 60 BPM

## 2025-08-21 DIAGNOSIS — T14.8XXA PUNCTURE WOUND: ICD-10-CM

## 2025-08-21 DIAGNOSIS — Z23 NEED FOR TDAP VACCINATION: Primary | ICD-10-CM

## 2025-08-21 PROCEDURE — 1036F TOBACCO NON-USER: CPT | Performed by: PHYSICIAN ASSISTANT

## 2025-08-21 PROCEDURE — 90471 IMMUNIZATION ADMIN: CPT | Performed by: PHYSICIAN ASSISTANT

## 2025-08-21 PROCEDURE — G8417 CALC BMI ABV UP PARAM F/U: HCPCS | Performed by: PHYSICIAN ASSISTANT

## 2025-08-21 PROCEDURE — 99213 OFFICE O/P EST LOW 20 MIN: CPT | Performed by: PHYSICIAN ASSISTANT

## 2025-08-21 PROCEDURE — 90715 TDAP VACCINE 7 YRS/> IM: CPT | Performed by: PHYSICIAN ASSISTANT

## 2025-08-21 PROCEDURE — G8427 DOCREV CUR MEDS BY ELIG CLIN: HCPCS | Performed by: PHYSICIAN ASSISTANT

## 2025-08-21 RX ORDER — MUPIROCIN 2 %
OINTMENT (GRAM) TOPICAL 2 TIMES DAILY
Qty: 22 G | Refills: 0 | Status: SHIPPED | OUTPATIENT
Start: 2025-08-21

## 2025-08-21 ASSESSMENT — ENCOUNTER SYMPTOMS
GASTROINTESTINAL NEGATIVE: 1
RESPIRATORY NEGATIVE: 1

## (undated) DEVICE — FRCP BX RADJAW4 NDL 2.8 240 STD OG

## (undated) DEVICE — PATIENT RETURN ELECTRODE, SINGLE-USE, CONTACT QUALITY MONITORING, ADULT, WITH 9FT CORD, FOR PATIENTS WEIGING OVER 33LBS. (15KG): Brand: MEGADYNE

## (undated) DEVICE — FRCP BIOP RADLJAW4 HOT 2.2X240 BX40

## (undated) DEVICE — CONMED SCOPE SAVER BITE BLOCK, 20X27 MM: Brand: SCOPE SAVER

## (undated) DEVICE — Device

## (undated) DEVICE — CANISTER, RIGID, 2000CC: Brand: MEDLINE INDUSTRIES, INC.

## (undated) DEVICE — FORCEP SPEC RETRV BX AD 2 MMX155 CM 5 MM GI OVL CUP W/ NDL

## (undated) DEVICE — HYBRID CO2 TUBING/CAP SET FOR OLYMPUS® SCOPES & CO2 SOURCE: Brand: ERBE

## (undated) DEVICE — LUBE JELLY PK/2.75GM STRL BX/144

## (undated) DEVICE — SYR LUER SLPTP 50ML

## (undated) DEVICE — ENDOSCOPY PORT CONNECTOR FOR OLYMPUS® SCOPES: Brand: ERBE